# Patient Record
Sex: FEMALE | Race: WHITE | NOT HISPANIC OR LATINO | Employment: OTHER | URBAN - METROPOLITAN AREA
[De-identification: names, ages, dates, MRNs, and addresses within clinical notes are randomized per-mention and may not be internally consistent; named-entity substitution may affect disease eponyms.]

---

## 2017-01-18 ENCOUNTER — HISTORICAL (OUTPATIENT)
Dept: ADMINISTRATIVE | Facility: HOSPITAL | Age: 47
End: 2017-01-18

## 2017-04-04 ENCOUNTER — HISTORICAL (OUTPATIENT)
Dept: ADMINISTRATIVE | Facility: HOSPITAL | Age: 47
End: 2017-04-04

## 2017-06-12 ENCOUNTER — OFFICE VISIT (OUTPATIENT)
Dept: NEUROLOGY | Facility: CLINIC | Age: 47
End: 2017-06-12
Payer: MEDICARE

## 2017-06-12 VITALS
BODY MASS INDEX: 25.37 KG/M2 | RESPIRATION RATE: 16 BRPM | WEIGHT: 161.63 LBS | HEART RATE: 80 BPM | SYSTOLIC BLOOD PRESSURE: 136 MMHG | HEIGHT: 67 IN | DIASTOLIC BLOOD PRESSURE: 82 MMHG

## 2017-06-12 DIAGNOSIS — M54.81 OCCIPITAL NEURALGIA OF LEFT SIDE: ICD-10-CM

## 2017-06-12 DIAGNOSIS — G43.719 CHRONIC MIGRAINE WITHOUT AURA, INTRACTABLE, WITHOUT STATUS MIGRAINOSUS: ICD-10-CM

## 2017-06-12 DIAGNOSIS — G24.3 CERVICAL DYSTONIA: Primary | ICD-10-CM

## 2017-06-12 PROCEDURE — 99204 OFFICE O/P NEW MOD 45 MIN: CPT | Mod: S$PBB | Performed by: PSYCHIATRY & NEUROLOGY

## 2017-06-12 PROCEDURE — 99203 OFFICE O/P NEW LOW 30 MIN: CPT | Mod: PBBFAC | Performed by: PSYCHIATRY & NEUROLOGY

## 2017-06-12 PROCEDURE — 99999 PR PBB SHADOW E&M-NEW PATIENT-LVL III: CPT | Mod: PBBFAC,,, | Performed by: PSYCHIATRY & NEUROLOGY

## 2017-06-12 RX ORDER — NEBIVOLOL 5 MG/1
2.5 TABLET ORAL DAILY
COMMUNITY
End: 2019-11-07 | Stop reason: SDUPTHER

## 2017-06-12 RX ORDER — DIAZEPAM 10 MG/1
10 TABLET ORAL
COMMUNITY
End: 2019-08-05 | Stop reason: SDUPTHER

## 2017-06-12 RX ORDER — TIZANIDINE 4 MG/1
4 TABLET ORAL 2 TIMES DAILY
COMMUNITY
End: 2018-06-21 | Stop reason: SDUPTHER

## 2017-06-12 RX ORDER — MIRTAZAPINE 15 MG/1
7.5 TABLET, FILM COATED ORAL NIGHTLY
COMMUNITY
End: 2020-07-06

## 2017-06-12 RX ORDER — HYDROCODONE BITARTRATE AND ACETAMINOPHEN 10; 325 MG/1; MG/1
TABLET ORAL EVERY 6 HOURS PRN
COMMUNITY
End: 2019-05-14 | Stop reason: SDUPTHER

## 2017-06-12 RX ORDER — BACLOFEN 10 MG/1
10 TABLET ORAL EVERY 4 HOURS
COMMUNITY
End: 2018-06-21 | Stop reason: SDUPTHER
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
## 2017-07-18 ENCOUNTER — TELEPHONE (OUTPATIENT)
Dept: NEUROLOGY | Facility: CLINIC | Age: 47
End: 2017-07-18

## 2017-07-18 DIAGNOSIS — G24.3 CERVICAL DYSTONIA: Primary | ICD-10-CM

## 2017-07-18 NOTE — TELEPHONE ENCOUNTER
Patient notified of appointment times and date. Voices understanding that she may need to see NP before injection date.

## 2017-07-18 NOTE — TELEPHONE ENCOUNTER
----- Message from June Resendiz sent at 2017 11:19 AM CDT -----  Contact: PATIENT  Daly Ramos  MRN: [id number]  : 1970  PCP: Jessica Fraga  Home Phone      [phone]  Work Phone      Not on file.  Mobile          [phone]      MESSAGE: Patient needs to schedule BOTOX & trigger point injections 3 months from now.  She states that Dr. Ruiz advised her to do so.      Phone: [phone]

## 2017-07-18 NOTE — TELEPHONE ENCOUNTER
Spoke with patient she states that she had her last Botox on 7/11/17 and would like to schedule her next Botox for October with you. She also stated that she gets Trigger Point injections also and would like to do these as well. She states that she would go one day for Trigger Point and the next for the Botox. Please advise. Previous records in [facility] from neurology.

## 2017-07-18 NOTE — TELEPHONE ENCOUNTER
Please review her case with Robyn upon Robyn's return to see if she needs to be seen prior to Trigger point injections.   Otherwise, ok to scheduled Botox 3 months from 7/11/17 injection  300 units of Botox ordered under orders only encounter.

## 2017-10-11 ENCOUNTER — PROCEDURE VISIT (OUTPATIENT)
Dept: NEUROLOGY | Facility: CLINIC | Age: 47
End: 2017-10-11
Payer: MEDICARE

## 2017-10-11 DIAGNOSIS — M79.18 MYOFASCIAL PAIN SYNDROME, CERVICAL: Primary | ICD-10-CM

## 2017-10-11 PROCEDURE — 20553 NJX 1/MLT TRIGGER POINTS 3/>: CPT | Mod: S$PBB | Performed by: NURSE PRACTITIONER

## 2017-10-11 PROCEDURE — 20553 NJX 1/MLT TRIGGER POINTS 3/>: CPT | Mod: PBBFAC | Performed by: NURSE PRACTITIONER

## 2017-10-11 PROCEDURE — 99999 PR STA SHADOW: CPT | Mod: PBBFAC,,, | Performed by: NURSE PRACTITIONER

## 2017-10-11 NOTE — PROCEDURES
Procedures Cervical TPI    Trigger points were palpated bilaterally along the left trapezius, Rhomboids, and Levator Scapulae. 10 sites were prepped with alcohol, and injected with 1 mL 0.5% Marcaine, using a 5 mL syringe with a 27 gauge ½ inch needle.     Patient tolerated procedure well and blood loss was minimal.

## 2017-10-12 ENCOUNTER — PROCEDURE VISIT (OUTPATIENT)
Dept: NEUROLOGY | Facility: CLINIC | Age: 47
End: 2017-10-12
Payer: MEDICARE

## 2017-10-12 DIAGNOSIS — G43.719 CHRONIC MIGRAINE WITHOUT AURA, INTRACTABLE, WITHOUT STATUS MIGRAINOSUS: ICD-10-CM

## 2017-10-12 DIAGNOSIS — G24.3 CERVICAL DYSTONIA: Primary | ICD-10-CM

## 2017-10-12 PROCEDURE — 99999 PR STA SHADOW: CPT | Mod: PBBFAC,,, | Performed by: PSYCHIATRY & NEUROLOGY

## 2017-10-12 PROCEDURE — 64616 CHEMODENERV MUSC NECK DYSTON: CPT | Mod: 50,S$PBB | Performed by: PSYCHIATRY & NEUROLOGY

## 2017-10-12 RX ORDER — SENNOSIDES 25 MG/1
TABLET, FILM COATED ORAL
Qty: 1 TUBE | Refills: 11 | Status: SHIPPED | OUTPATIENT
Start: 2017-10-12 | End: 2021-05-12 | Stop reason: SDUPTHER

## 2017-10-12 RX ADMIN — ONABOTULINUMTOXINA 300 UNITS: 100 INJECTION, POWDER, LYOPHILIZED, FOR SOLUTION INTRADERMAL; INTRAMUSCULAR at 02:10

## 2017-10-12 NOTE — PROCEDURES
Procedures     BOTOX PROCEDURE NOTE     Date of Procedure: 10/12/17    Reason for Proceedure: Chronic Migraine and cervical dystonia      Informed consent was obtained prior to performing this study. Two patient identifiers were confirmed with the patient prior to performing this study. A time out to determine correct patient and and agreement on procedure performed was conducted prior to the injections.   Patient uses  topical ethyl chloride for topical  anaesthetic- she uses this prior to injections no adverse reactions noted with her topical use today. Can continue PRN prior to injections  Procedure Details: After informed consent obtained the patient's head and upper neck was cleansed with alcohol rub and 285 Units of Botox (diluted 1:1) was injected in the following bilateral muscles:   5units in corregator* (over 2 sites), 5 units in Procerus, 20 units Frontalis* (over 4 sites), 20 units in Temporalis* (over 4 sites), 30 units in occipitalis* (over 6 sites), 10 units in each semispinalis capitis, 10 units in each splenius capitus, 10 units in the right longismus and 15 units in the left longismus, 60 units in Trapezius* on the right and 70 units on trapezius on the left(over 4 sites). The remaining 15 units were wasted per her last injection series  Levator muscles were not injected as patient states she had side effects with those injections prior.   The patient tolerated the procedure well with no more than 0.25cc of blood loss. She was observed for several minutes post injection and given a handout from [employer] regarding when and where to seek help if side effects are experienced.  Reviewed her prior studies and EMG findings (cervical radiculopathy, right median and ulnar neuropathies) - she states migraine and dystonia pain are her only active complaints. No further work up or treatment needed.   Lidoderm topical cream ordered for her neck pain PRN- helped prior.   RTC in 6 weeks.

## 2017-11-28 ENCOUNTER — OFFICE VISIT (OUTPATIENT)
Dept: NEUROLOGY | Facility: CLINIC | Age: 47
End: 2017-11-28
Payer: MEDICARE

## 2017-11-28 VITALS
SYSTOLIC BLOOD PRESSURE: 132 MMHG | BODY MASS INDEX: 26.21 KG/M2 | WEIGHT: 167 LBS | DIASTOLIC BLOOD PRESSURE: 78 MMHG | HEIGHT: 67 IN

## 2017-11-28 DIAGNOSIS — M79.18 MYOFASCIAL PAIN: ICD-10-CM

## 2017-11-28 DIAGNOSIS — G24.3 CERVICAL DYSTONIA: ICD-10-CM

## 2017-11-28 PROCEDURE — 99999 PR PBB SHADOW E&M-EST. PATIENT-LVL III: CPT | Mod: PBBFAC,,, | Performed by: NURSE PRACTITIONER

## 2017-11-28 PROCEDURE — 99213 OFFICE O/P EST LOW 20 MIN: CPT | Mod: PBBFAC | Performed by: NURSE PRACTITIONER

## 2017-11-28 PROCEDURE — 99999 PR STA SHADOW: CPT | Mod: PBBFAC,,, | Performed by: NURSE PRACTITIONER

## 2017-11-28 PROCEDURE — 99214 OFFICE O/P EST MOD 30 MIN: CPT | Mod: S$PBB | Performed by: NURSE PRACTITIONER
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
## 2018-01-10 ENCOUNTER — PROCEDURE VISIT (OUTPATIENT)
Dept: NEUROLOGY | Facility: CLINIC | Age: 48
End: 2018-01-10
Payer: MEDICARE

## 2018-01-10 DIAGNOSIS — M79.18 MYOFASCIAL PAIN: Primary | ICD-10-CM

## 2018-01-10 PROCEDURE — 20553 NJX 1/MLT TRIGGER POINTS 3/>: CPT | Mod: PBBFAC | Performed by: NURSE PRACTITIONER

## 2018-01-10 PROCEDURE — 20553 NJX 1/MLT TRIGGER POINTS 3/>: CPT | Mod: S$PBB | Performed by: NURSE PRACTITIONER

## 2018-01-10 PROCEDURE — 99999 PR STA SHADOW: CPT | Mod: PBBFAC,,, | Performed by: NURSE PRACTITIONER

## 2018-01-10 NOTE — PROCEDURES
Procedures Cervical TPI    Trigger points were palpated along the left trapezius, Rhomboids, and Levator Scapulae. Sites were prepped with alcohol, and injected with 1 mL 0.5% Marcaine, using a 5 mL syringe with a 30 gauge ½ inch needle.     Patient tolerated procedure well and blood loss was minimal.

## 2018-01-11 ENCOUNTER — PROCEDURE VISIT (OUTPATIENT)
Dept: NEUROLOGY | Facility: CLINIC | Age: 48
End: 2018-01-11
Payer: MEDICARE

## 2018-01-11 DIAGNOSIS — G24.3 CERVICAL DYSTONIA: Primary | ICD-10-CM

## 2018-01-11 DIAGNOSIS — G43.719 CHRONIC MIGRAINE WITHOUT AURA, INTRACTABLE, WITHOUT STATUS MIGRAINOSUS: ICD-10-CM

## 2018-01-11 PROCEDURE — 64616 CHEMODENERV MUSC NECK DYSTON: CPT | Mod: 50,S$PBB | Performed by: PSYCHIATRY & NEUROLOGY

## 2018-01-11 PROCEDURE — 99999 PR STA SHADOW: CPT | Mod: PBBFAC,,, | Performed by: PSYCHIATRY & NEUROLOGY

## 2018-01-11 RX ADMIN — ONABOTULINUMTOXINA 300 UNITS: 100 INJECTION, POWDER, LYOPHILIZED, FOR SOLUTION INTRADERMAL; INTRAMUSCULAR at 02:01

## 2018-01-11 NOTE — PROCEDURES
Procedures     BOTOX PROCEDURE NOTE     Date of Procedure: 1/11/18    Reason for Proceedure: Chronic Migraine and cervical dystonia      Informed consent was obtained prior to performing this study. Two patient identifiers were confirmed with the patient prior to performing this study. A time out to determine correct patient and and agreement on procedure performed was conducted prior to the injections.   Patient uses  topical ethyl chloride for topical  anaesthetic- she uses this prior to injections no adverse reactions noted with her topical use today. Can continue PRN prior to injections  Procedure Details: After informed consent obtained the patient's head and upper neck was cleansed with alcohol rub and 285 Units of Botox (diluted 1:1) was injected in the following bilateral muscles:   5units in corregator* (over 2 sites), 5 units in Procerus, 20 units Frontalis* (over 4 sites), 20 units in Temporalis* (over 4 sites), 30 units in occipitalis* (over 6 sites), 10 units in each semispinalis capitis, 10 units in each splenius capitus, 10 units in the right longismus and 15 units in the left longismus, 60 units in Trapezius* on the right and 70 units on trapezius on the left(over 8 sites). The remaining 15 units were wasted per her last injection series  Levator muscles were not injected as patient states she had side effects with those injections prior.   The patient tolerated the procedure well with no more than 0.25cc of blood loss. She was observed for several minutes post injection and given a handout from [employer] regarding when and where to seek help if side effects are experienced.  -Note injections were spread to more sights for Trapezius(same amount of Botox) with this injection series per her request  -Note her prior studies and  EMG findings (cervical radiculopathy, right median and ulnar neuropathies) . However, she states migraine and dystonia pain are her only active complaints. No further work up or  treatment needed at this time.   - Continue scheduled left cervical trigger point injections to occur the same week as Botox q 3 months.Lidoderm topical cream given prior for her neck pain PRN  -Continue Botox for chronic migraine and for cervical dystonia. She uses 300 units which does help reduce pain from both conditions. .  -She continues with prn use of Hydrocodone, which can worsen her headaches. She was previously encouraged to wean this  RTC in 3 months for  Botox and for cervical trigger point injections.

## 2018-04-11 ENCOUNTER — PROCEDURE VISIT (OUTPATIENT)
Dept: NEUROLOGY | Facility: CLINIC | Age: 48
End: 2018-04-11
Payer: MEDICARE

## 2018-04-11 DIAGNOSIS — M79.18 MYOFASCIAL PAIN: Primary | ICD-10-CM

## 2018-04-11 PROCEDURE — 99999 PR STA SHADOW: CPT | Mod: PBBFAC,,, | Performed by: NURSE PRACTITIONER

## 2018-04-11 PROCEDURE — 20553 NJX 1/MLT TRIGGER POINTS 3/>: CPT | Mod: S$PBB | Performed by: NURSE PRACTITIONER

## 2018-04-11 PROCEDURE — 20553 NJX 1/MLT TRIGGER POINTS 3/>: CPT | Mod: PBBFAC | Performed by: NURSE PRACTITIONER

## 2018-04-11 NOTE — PROCEDURES
Procedures Cervical TPI    Trigger points were palpated  along the right trapezius, Rhomboids, and Levator Scapulae. Sites were prepped with alcohol, and injected with 1 mL 0.5% Marcaine, using a 5 mL syringe with a 30 gauge ½ inch needle.     Patient tolerated procedure well and blood loss was minimal.

## 2018-04-12 ENCOUNTER — PROCEDURE VISIT (OUTPATIENT)
Dept: NEUROLOGY | Facility: CLINIC | Age: 48
End: 2018-04-12
Payer: MEDICARE

## 2018-04-12 DIAGNOSIS — M79.18 MYOFASCIAL MUSCLE PAIN: ICD-10-CM

## 2018-04-12 DIAGNOSIS — G43.719 CHRONIC MIGRAINE WITHOUT AURA, INTRACTABLE, WITHOUT STATUS MIGRAINOSUS: ICD-10-CM

## 2018-04-12 DIAGNOSIS — G24.3 CERVICAL DYSTONIA: Primary | ICD-10-CM

## 2018-04-12 PROCEDURE — 99999 PR STA SHADOW: CPT | Mod: PBBFAC,,, | Performed by: PSYCHIATRY & NEUROLOGY

## 2018-04-12 PROCEDURE — 64616 CHEMODENERV MUSC NECK DYSTON: CPT | Mod: 50,PBBFAC | Performed by: PSYCHIATRY & NEUROLOGY

## 2018-04-12 RX ADMIN — ONABOTULINUMTOXINA 300 UNITS: 100 INJECTION, POWDER, LYOPHILIZED, FOR SOLUTION INTRADERMAL; INTRAMUSCULAR at 01:04

## 2018-04-12 NOTE — PROCEDURES
Procedures   Date of Procedure: 4/12/18    Reason for Proceedure: Chronic Migraine and cervical dystonia      Informed consent was obtained prior to performing this study. Two patient identifiers were confirmed with the patient prior to performing this study. A time out to determine correct patient and and agreement on procedure performed was conducted prior to the injections.   Patient uses  topical ethyl chloride for topical  anaesthetic- she uses this prior to injections with no adverse reactions noted with her topical use. Can continue PRN prior to injections  Procedure Details: After informed consent obtained the patient's head and upper neck was cleansed with alcohol rub and 285 Units of Botox (diluted 1:1) was injected in the following bilateral muscles:   5units in corregator* (over 2 sites), 5 units in Procerus, 20 units Frontalis* (over 4 sites), 20 units in Temporalis* (over 4 sites), 30 units in occipitalis* (over 6 sites), 10 units in each semispinalis capitis, 10 units in each splenius capitus, 10 units in the right longismus and 15 units in the left longismus, 60 units in Trapezius* on the right and 70 units on trapezius on the left(over 8 sites). The remaining 15 units were wasted per her last injection series  Levator muscles were not injected as patient states she had side effects with those injections prior.   The patient tolerated the procedure well with no more than 0.25cc of blood loss. She was observed for several minutes post injection and given a handout from [employer] regarding when and where to seek help if side effects are experienced.  -Note injections were spread to more sights for Trapezius(same amount of Botox) per her request again today which helped  -Note her prior known cervical radiculopathy, right median and ulnar neuropathies which are not clinically active .   - Ok to Continue cervical trigger point injections the same week as Botox q 3 months Yesterday, she noted brief left  arm tingling after injection, which was likely a benign incident post local injection. Notify me if any recurrence. .Lidoderm topical cream given prior for her neck pain PRN  -Continue Botox for chronic migraine and for cervical dystonia. She uses 300 units which does help reduce pain from both conditions for 1.5 months after injection .  -She continues with prn use of Hydrocodone, which can worsen her headaches and was previously encouraged to wean this  RTC in 3 months for  Botox and for cervical trigger point injections.

## 2018-06-20 ENCOUNTER — PATIENT MESSAGE (OUTPATIENT)
Dept: NEUROLOGY | Facility: CLINIC | Age: 48
End: 2018-06-20

## 2018-06-21 RX ORDER — TIZANIDINE 4 MG/1
4 TABLET ORAL 3 TIMES DAILY
Qty: 270 TABLET | Refills: 3 | Status: SHIPPED | OUTPATIENT
Start: 2018-06-21 | End: 2019-06-16

## 2018-06-21 RX ORDER — BACLOFEN 10 MG/1
10 TABLET ORAL EVERY 4 HOURS
Qty: 540 TABLET | Refills: 3 | Status: SHIPPED | OUTPATIENT
Start: 2018-06-21 | End: 2019-06-07 | Stop reason: SDUPTHER

## 2018-07-11 ENCOUNTER — PROCEDURE VISIT (OUTPATIENT)
Dept: NEUROLOGY | Facility: CLINIC | Age: 48
End: 2018-07-11
Payer: MEDICARE

## 2018-07-11 DIAGNOSIS — M79.18 MYOFASCIAL MUSCLE PAIN: ICD-10-CM

## 2018-07-11 PROCEDURE — 20553 NJX 1/MLT TRIGGER POINTS 3/>: CPT | Mod: PBBFAC | Performed by: NURSE PRACTITIONER

## 2018-07-11 PROCEDURE — 20553 NJX 1/MLT TRIGGER POINTS 3/>: CPT | Mod: S$PBB | Performed by: NURSE PRACTITIONER

## 2018-07-11 PROCEDURE — 99999 PR STA SHADOW: CPT | Mod: PBBFAC,,, | Performed by: NURSE PRACTITIONER

## 2018-07-12 ENCOUNTER — PROCEDURE VISIT (OUTPATIENT)
Dept: NEUROLOGY | Facility: CLINIC | Age: 48
End: 2018-07-12
Payer: MEDICARE

## 2018-07-12 DIAGNOSIS — G43.719 CHRONIC MIGRAINE WITHOUT AURA, INTRACTABLE, WITHOUT STATUS MIGRAINOSUS: ICD-10-CM

## 2018-07-12 DIAGNOSIS — M79.18 MYOFASCIAL MUSCLE PAIN: ICD-10-CM

## 2018-07-12 DIAGNOSIS — G24.3 CERVICAL DYSTONIA: Primary | ICD-10-CM

## 2018-07-12 PROCEDURE — 99999 PR STA SHADOW: CPT | Mod: PBBFAC,,, | Performed by: PSYCHIATRY & NEUROLOGY

## 2018-07-12 PROCEDURE — 64616 CHEMODENERV MUSC NECK DYSTON: CPT | Mod: 50,PBBFAC | Performed by: PSYCHIATRY & NEUROLOGY

## 2018-07-12 RX ADMIN — ONABOTULINUMTOXINA 300 UNITS: 100 INJECTION, POWDER, LYOPHILIZED, FOR SOLUTION INTRADERMAL; INTRAMUSCULAR at 02:07

## 2018-07-12 NOTE — PROCEDURES
Procedures     Procedures   Date of Procedure: 7/12/18    Reason for Proceedure: Chronic Migraine and cervical dystonia      Informed consent was obtained prior to performing this study. Two patient identifiers were confirmed with the patient prior to performing this study. A time out to determine correct patient and and agreement on procedure performed was conducted prior to the injections.   Patient uses  topical ethyl chloride for topical  anaesthetic- she uses this prior to injections with no adverse reactions noted with her topical use. Can continue PRN prior to injections  Procedure Details: After informed consent obtained the patient's head and upper neck was cleansed with alcohol rub and 285 Units of Botox (diluted 1:1) was injected in the following bilateral muscles:   5units in corregator* (over 2 sites), 5 units in Procerus, 20 units Frontalis* (over 4 sites), 20 units in Temporalis* (over 4 sites), 30 units in occipitalis* (over 6 sites), 10 units in each semispinalis capitis, 10 units in each splenius capitus, 10 units in the right longismus and 15 units in the left longismus, 60 units in Trapezius* on the right and 70 units on trapezius on the left(over 8 sites). The remaining 15 units were wasted per her last injection series  Levator muscles were not injected as patient states she had side effects with those injections prior.   The patient tolerated the procedure well with no more than 0.25cc of blood loss. She was observed for several minutes post injection and given a handout from [employer] regarding when and where to seek help if side effects are experienced.    -Note injections are  spread to more sights for Trapezius(same amount of Botox) per her request as this helps  -Note her prior known cervical radiculopathy, right median and ulnar neuropathies which are not clinically active .   - Ok to Continue cervical trigger point injections the same week as Botox q 3 months. She has not had any  recurrent symptoms after injection .Lidoderm topical cream given prior for her neck pain PRN  -Continue Botox for chronic migraine and for cervical dystonia. She uses 300 units which does help reduce pain from both conditions for 1.5 months after injection .  -She continues with prn use of Hydrocodone, which can worsen her headaches and was previously encouraged to wean this  -Continue baclofen along with tizanidine for additional muscle relaxation unless sedation- she has chronically dosed all at bedtime without sedation  RTC in 3 months for  Botox and for cervical trigger point injections.

## 2018-10-08 ENCOUNTER — PATIENT MESSAGE (OUTPATIENT)
Dept: NEUROLOGY | Facility: CLINIC | Age: 48
End: 2018-10-08

## 2018-10-10 ENCOUNTER — PROCEDURE VISIT (OUTPATIENT)
Dept: NEUROLOGY | Facility: CLINIC | Age: 48
End: 2018-10-10
Payer: MEDICARE

## 2018-10-10 DIAGNOSIS — M79.18 MYOFASCIAL MUSCLE PAIN: ICD-10-CM

## 2018-10-10 PROCEDURE — 99999 PR STA SHADOW: CPT | Mod: PBBFAC,,, | Performed by: NURSE PRACTITIONER

## 2018-10-10 PROCEDURE — 20553 NJX 1/MLT TRIGGER POINTS 3/>: CPT | Mod: S$PBB | Performed by: NURSE PRACTITIONER

## 2018-10-10 NOTE — PROCEDURES
Procedures Cervical TPI    Trigger points were palpated bilaterally along the Bilateral trapezius, Rhomboids, and Levator Scapulae. Sites were prepped with alcohol, and injected with 1 mL 0.5% Marcaine, using a 5 mL syringe with a 30 gauge ½ inch needle.     Patient tolerated procedure well and blood loss was minimal.

## 2018-10-11 ENCOUNTER — PROCEDURE VISIT (OUTPATIENT)
Dept: NEUROLOGY | Facility: CLINIC | Age: 48
End: 2018-10-11
Payer: MEDICARE

## 2018-10-11 DIAGNOSIS — M79.18 MYOFASCIAL PAIN: ICD-10-CM

## 2018-10-11 DIAGNOSIS — M54.2 CERVICALGIA: ICD-10-CM

## 2018-10-11 DIAGNOSIS — G43.719 CHRONIC MIGRAINE WITHOUT AURA, INTRACTABLE, WITHOUT STATUS MIGRAINOSUS: ICD-10-CM

## 2018-10-11 DIAGNOSIS — G24.3 CERVICAL DYSTONIA: Primary | ICD-10-CM

## 2018-10-11 PROCEDURE — 99999 PR STA SHADOW: CPT | Mod: PBBFAC,,, | Performed by: PSYCHIATRY & NEUROLOGY

## 2018-10-11 PROCEDURE — 64616 CHEMODENERV MUSC NECK DYSTON: CPT | Mod: 50,S$PBB | Performed by: PSYCHIATRY & NEUROLOGY

## 2018-10-11 RX ADMIN — ONABOTULINUMTOXINA 300 UNITS: 100 INJECTION, POWDER, LYOPHILIZED, FOR SOLUTION INTRADERMAL; INTRAMUSCULAR at 03:10

## 2018-10-11 NOTE — PROCEDURES
Date of Procedure: 10/11/18    Reason for Procedure: Chronic Migraine and cervical dystonia      Informed consent was obtained prior to performing this study. Two patient identifiers were confirmed with the patient prior to performing this study. A time out to determine correct patient and and agreement on procedure performed was conducted prior to the injections.   Patient uses  topical ethyl chloride for topical  anaesthetic- she uses this prior to injections with no adverse reactions noted with her topical use. Can continue PRN prior to injections  Procedure Details: After informed consent obtained the patient's head and upper neck was cleansed with alcohol rub and 285 Units of Botox (diluted 1:1) was injected in the following bilateral muscles:   5units in corregator* (over 2 sites), 5 units in Procerus, 20 units Frontalis* (over 4 sites), 20 units in Temporalis* (over 4 sites), 30 units in occipitalis* (over 6 sites), 10 units in each semispinalis capitis, 10 units in each splenius capitus, 10 units in the right longismus and 15 units in the left longismus, 60 units in Trapezius* on the right and 70 units on trapezius on the left(over 8 sites). The remaining 15 units were wasted per her last injection series  Levator muscles were not injected as patient states she had side effects with those injections prior.   The patient tolerated the procedure well with no more than 0.25cc of blood loss. She was observed for several minutes post injection and given a handout from [employer] regarding when and where to seek help if side effects are experienced.     -Note injections are  spread to more sights for Trapezius(same amount of Botox) per her request as this helps  -Note her prior known cervical radiculopathy, right median and ulnar neuropathies which are not clinically active .   - She will Continue cervical trigger point injections the same week as Botox q 3 months for cervicalgia. .Lidoderm topical cream given prior  for her neck pain PRN  -Continue Botox for chronic migraine and for cervical dystonia. She uses 300 units which does help reduce pain from both conditions for 1.5 months consistently after injection .  -She continues with prn use of Hydrocodone via PCP, which can worsen her headaches, and she was previously encouraged to wean this  -Continue baclofen along with tizanidine for additional muscle relaxation unless sedation- she has chronically dosed all at bedtime without sedation  RTC in 3 months for  Botox and for cervical trigger point injections.

## 2019-01-09 ENCOUNTER — PROCEDURE VISIT (OUTPATIENT)
Dept: NEUROLOGY | Facility: CLINIC | Age: 49
End: 2019-01-09
Payer: MEDICARE

## 2019-01-09 DIAGNOSIS — M54.2 CERVICALGIA: Primary | ICD-10-CM

## 2019-01-09 DIAGNOSIS — M79.18 MYOFASCIAL PAIN: ICD-10-CM

## 2019-01-09 PROCEDURE — 99999 PR STA SHADOW: CPT | Mod: PBBFAC,,, | Performed by: NURSE PRACTITIONER

## 2019-01-09 PROCEDURE — 99999 PR STA SHADOW: ICD-10-PCS | Mod: PBBFAC,,, | Performed by: NURSE PRACTITIONER

## 2019-01-09 PROCEDURE — 20553 NJX 1/MLT TRIGGER POINTS 3/>: CPT | Mod: PBBFAC | Performed by: NURSE PRACTITIONER

## 2019-01-10 ENCOUNTER — PROCEDURE VISIT (OUTPATIENT)
Dept: NEUROLOGY | Facility: CLINIC | Age: 49
End: 2019-01-10
Payer: MEDICARE

## 2019-01-10 DIAGNOSIS — G43.719 CHRONIC MIGRAINE WITHOUT AURA, INTRACTABLE, WITHOUT STATUS MIGRAINOSUS: ICD-10-CM

## 2019-01-10 DIAGNOSIS — M79.18 MYOFASCIAL PAIN: ICD-10-CM

## 2019-01-10 DIAGNOSIS — G24.3 CERVICAL DYSTONIA: Primary | ICD-10-CM

## 2019-01-10 PROCEDURE — 99999 PR STA SHADOW: ICD-10-PCS | Mod: PBBFAC,,, | Performed by: PSYCHIATRY & NEUROLOGY

## 2019-01-10 PROCEDURE — 99999 PR STA SHADOW: CPT | Mod: PBBFAC,,, | Performed by: PSYCHIATRY & NEUROLOGY

## 2019-01-10 PROCEDURE — 64616 CHEMODENERV MUSC NECK DYSTON: CPT | Mod: S$PBB | Performed by: PSYCHIATRY & NEUROLOGY

## 2019-01-10 RX ADMIN — ONABOTULINUMTOXINA 300 UNITS: 100 INJECTION, POWDER, LYOPHILIZED, FOR SOLUTION INTRADERMAL; INTRAMUSCULAR at 03:01

## 2019-01-10 NOTE — PROCEDURES
Date of Procedure: 1/10/18    Reason for Procedure: Chronic Migraine and cervical dystonia      Informed consent was obtained prior to performing this study. Two patient identifiers were confirmed with the patient prior to performing this study. A time out to determine correct patient and and agreement on procedure performed was conducted prior to the injections.   Patient uses  topical ethyl chloride for topical  anaesthetic- she uses this prior to injections with no adverse reactions noted with her topical use. Can continue PRN prior to injections  Procedure Details: After informed consent obtained the patient's head and upper neck was cleansed with alcohol rub and 285 Units of Botox (diluted 1:1) was injected in the following bilateral muscles:   5units in corregator* (over 2 sites), 5 units in Procerus, 20 units Frontalis* (over 4 sites), 20 units in Temporalis* (over 4 sites), 30 units in occipitalis* (over 6 sites), 10 units in each semispinalis capitis, 10 units in each splenius capitus, 10 units in the right longismus and 15 units in the left longismus, 60 units in Trapezius* on the right and 70 units on trapezius on the left(over 8 sites). The remaining 15 units were wasted per her last injection series  Levator muscles were not injected as patient states she had side effects with those injections prior.   The patient tolerated the procedure well with no more than 0.25cc of blood loss. She was observed for several minutes post injection and given a handout from [employer] regarding when and where to seek help if side effects are experienced.     -Note injections are  spread to more sights for Trapezius(same amount of Botox) per her request as this helps  -Note her prior known cervical radiculopathy, right median and ulnar neuropathies which are not clinically active .   - She will Continue cervical trigger point injections the same week as Botox q 3 months for cervicalgia. .Lidoderm topical cream given prior  for her neck pain PRN  -Continue Botox for chronic migraine and for cervical dystonia. She uses 300 units which does help reduce pain from both conditions for 2.5 months consistently after injection now (improved further) .  -She continues with prn use of Hydrocodone via PCP, which can worsen her headaches, and she was previously encouraged to wean this  -Continue baclofen along with tizanidine for additional muscle relaxation unless sedation- she has chronically dosed all at bedtime without sedation  RTC in 3 months for  Botox and for cervical trigger point injections.

## 2019-04-10 ENCOUNTER — PROCEDURE VISIT (OUTPATIENT)
Dept: NEUROLOGY | Facility: CLINIC | Age: 49
End: 2019-04-10
Payer: MEDICARE

## 2019-04-10 DIAGNOSIS — M79.18 MYOFASCIAL PAIN: ICD-10-CM

## 2019-04-10 PROCEDURE — 99999 PR STA SHADOW: CPT | Mod: PBBFAC,,, | Performed by: NURSE PRACTITIONER

## 2019-04-10 PROCEDURE — 99999 PR STA SHADOW: ICD-10-PCS | Mod: PBBFAC,,, | Performed by: NURSE PRACTITIONER

## 2019-04-10 PROCEDURE — 20553 NJX 1/MLT TRIGGER POINTS 3/>: CPT | Mod: PBBFAC | Performed by: NURSE PRACTITIONER

## 2019-04-10 NOTE — PROCEDURES
Procedures Cervical TPI    Trigger points were palpated along the right trapezius, Rhomboid, and Levator Scapulae. Sites were prepped with alcohol, and injected with 1 mL 0.5% Marcaine, using a 5 mL syringe with a 30 gauge ½ inch needle.     Patient tolerated procedure well and blood loss was minimal.

## 2019-04-11 ENCOUNTER — PROCEDURE VISIT (OUTPATIENT)
Dept: NEUROLOGY | Facility: CLINIC | Age: 49
End: 2019-04-11
Payer: MEDICARE

## 2019-04-11 DIAGNOSIS — M79.18 MYOFASCIAL PAIN: ICD-10-CM

## 2019-04-11 DIAGNOSIS — G24.3 CERVICAL DYSTONIA: Primary | ICD-10-CM

## 2019-04-11 PROCEDURE — 64616 CHEMODENERV MUSC NECK DYSTON: CPT | Mod: 50,PBBFAC | Performed by: PSYCHIATRY & NEUROLOGY

## 2019-04-11 PROCEDURE — 99999 PR STA SHADOW: ICD-10-PCS | Mod: S$PBB,PBBFAC,, | Performed by: PSYCHIATRY & NEUROLOGY

## 2019-04-11 PROCEDURE — 99999 PR STA SHADOW: CPT | Mod: PBBFAC,,, | Performed by: PSYCHIATRY & NEUROLOGY

## 2019-04-11 RX ADMIN — ONABOTULINUMTOXINA 300 UNITS: 100 INJECTION, POWDER, LYOPHILIZED, FOR SOLUTION INTRADERMAL; INTRAMUSCULAR at 03:04

## 2019-04-11 NOTE — PROCEDURES
Procedures   Date of Procedure: 4/11/19    Reason for Procedure: Chronic Migraine and cervical dystonia      Informed consent was obtained prior to performing this study. Two patient identifiers were confirmed with the patient prior to performing this study. A time out to determine correct patient and and agreement on procedure performed was conducted prior to the injections.   Patient uses  topical ethyl chloride for topical  anaesthetic- she uses this prior to injections with no adverse reactions noted with her topical use. Can continue PRN prior to injections  Procedure Details: After informed consent obtained the patient's head and upper neck was cleansed with alcohol rub and 285 Units of Botox (diluted 1:1) was injected in the following bilateral muscles:   5units in corregator* (over 2 sites), 5 units in Procerus, 20 units Frontalis* (over 4 sites), 20 units in Temporalis* (over 4 sites), 30 units in occipitalis* (over 6 sites), 10 units in each semispinalis capitis, 10 units in each splenius capitus, 10 units in the right longismus and 15 units in the left longismus, 60 units in Trapezius* on the right and 70 units on trapezius on the left(over 8 sites). The remaining 15 units were wasted per her last injection series  Levator muscles were not injected as patient states she had side effects with those injections prior.   The patient tolerated the procedure well with no more than 0.25cc of blood loss. She was observed for several minutes post injection and given a handout from [employer] regarding when and where to seek help if side effects are experienced.     -Note injections are  spread to more sights for Trapezius(same amount of Botox) per her request as this helps  -Note her prior known cervical radiculopathy, right median and ulnar neuropathies which are not clinically active .   - She will Continue cervical trigger point injections the same week as Botox q 3 months for cervicalgia. .Lidoderm topical cream  given prior for her neck pain PRN  -Continue Botox for chronic migraine and for cervical dystonia. She uses nearly 300 units as above which does help reduce pain from both conditions for 2.5 months consistently after injection at this time  -She continues with prn use of Hydrocodone via PCP, which can worsen her headaches, and she was previously encouraged to wean this  -Continue baclofen along with tizanidine for additional muscle relaxation unless sedation with combination use- she has chronically dosed both at bedtime without sedation  RTC in 3 months for  Botox and for cervical trigger point injections.

## 2019-04-21 ENCOUNTER — PATIENT MESSAGE (OUTPATIENT)
Dept: NEUROLOGY | Facility: CLINIC | Age: 49
End: 2019-04-21

## 2019-05-14 PROBLEM — I10 ESSENTIAL (PRIMARY) HYPERTENSION: Status: ACTIVE | Noted: 2019-05-14

## 2019-05-14 PROBLEM — M41.25 OTHER IDIOPATHIC SCOLIOSIS, THORACOLUMBAR REGION: Status: ACTIVE | Noted: 2019-05-14

## 2019-05-14 PROBLEM — K59.03 DRUG-INDUCED CONSTIPATION: Status: ACTIVE | Noted: 2019-05-14

## 2019-06-10 RX ORDER — BACLOFEN 10 MG/1
10 TABLET ORAL EVERY 4 HOURS
Qty: 540 TABLET | Refills: 3 | Status: SHIPPED | OUTPATIENT
Start: 2019-06-10 | End: 2020-07-21 | Stop reason: SDUPTHER

## 2019-06-11 ENCOUNTER — PATIENT MESSAGE (OUTPATIENT)
Dept: NEUROLOGY | Facility: CLINIC | Age: 49
End: 2019-06-11

## 2019-06-11 DIAGNOSIS — M54.2 CERVICALGIA: Primary | ICD-10-CM

## 2019-07-10 ENCOUNTER — PROCEDURE VISIT (OUTPATIENT)
Dept: NEUROLOGY | Facility: CLINIC | Age: 49
End: 2019-07-10
Payer: MEDICARE

## 2019-07-10 DIAGNOSIS — M79.18 MYOFASCIAL PAIN: ICD-10-CM

## 2019-07-10 PROCEDURE — 99999 PR STA SHADOW: CPT | Mod: PBBFAC,,, | Performed by: NURSE PRACTITIONER

## 2019-07-10 PROCEDURE — 99999 PR STA SHADOW: ICD-10-PCS | Mod: PBBFAC,,, | Performed by: NURSE PRACTITIONER

## 2019-07-10 PROCEDURE — 20553 NJX 1/MLT TRIGGER POINTS 3/>: CPT | Mod: S$PBB | Performed by: NURSE PRACTITIONER

## 2019-07-10 NOTE — PROCEDURES
Procedures Cervical TPI    Trigger points were palpated along the left trapezius, Rhomboid, and Levator Scapulae. Sites were prepped with alcohol, and injected with 1 mL 0.5% Marcaine, using a 5 mL syringe with a 30 gauge ½ inch needle.     Patient tolerated procedure well and blood loss was minimal.

## 2019-07-11 ENCOUNTER — PROCEDURE VISIT (OUTPATIENT)
Dept: NEUROLOGY | Facility: CLINIC | Age: 49
End: 2019-07-11
Payer: MEDICARE

## 2019-07-11 DIAGNOSIS — M54.2 CERVICALGIA: ICD-10-CM

## 2019-07-11 DIAGNOSIS — G24.3 CERVICAL DYSTONIA: ICD-10-CM

## 2019-07-11 DIAGNOSIS — M79.18 MYOFASCIAL PAIN: Primary | ICD-10-CM

## 2019-07-11 PROCEDURE — 64616 CHEMODENERV MUSC NECK DYSTON: CPT | Mod: 50,S$PBB | Performed by: PSYCHIATRY & NEUROLOGY

## 2019-07-11 PROCEDURE — 99999 PR STA SHADOW: CPT | Mod: PBBFAC,,, | Performed by: PSYCHIATRY & NEUROLOGY

## 2019-07-11 PROCEDURE — 99999 PR STA SHADOW: ICD-10-PCS | Mod: PBBFAC,,, | Performed by: PSYCHIATRY & NEUROLOGY

## 2019-07-11 RX ADMIN — ONABOTULINUMTOXINA 300 UNITS: 100 INJECTION, POWDER, LYOPHILIZED, FOR SOLUTION INTRADERMAL; INTRAMUSCULAR at 03:07

## 2019-07-11 NOTE — PROCEDURES
Procedures     Procedures   Date of Procedure: 7/11/19    Reason for Procedure: Chronic Migraine and cervical dystonia      Informed consent was obtained prior to performing this study. Two patient identifiers were confirmed with the patient prior to performing this study. A time out to determine correct patient and and agreement on procedure performed was conducted prior to the injections.   Patient uses  topical ethyl chloride for topical  anaesthetic- she uses this prior to injections with no adverse reactions noted with her topical use. Can continue PRN prior to injections  Procedure Details: After informed consent obtained the patient's head and upper neck was cleansed with alcohol rub and 285 Units of Botox (diluted 1:1) was injected in the following bilateral muscles:   5units in corregator* (over 2 sites), 5 units in Procerus, 20 units Frontalis* (over 4 sites), 20 units in Temporalis* (over 4 sites), 30 units in occipitalis* (over 6 sites), 10 units in each semispinalis capitis, 10 units in each splenius capitus, 10 units in the right longismus and 15 units in the left longismus, 60 units in Trapezius* on the right and 70 units on trapezius on the left(over 8 sites). The remaining 15 units were wasted per her last injection series  Levator muscles were not injected as patient states she had side effects with those injections prior.   The patient tolerated the procedure well with no more than 0.25cc of blood loss. She was observed for several minutes post injection and given a handout from [employer] regarding when and where to seek help if side effects are experienced.     -Note injections are  spread to more sights for Trapezius(same amount of Botox) per her request as this helps  -She had emailed that she had no benefit 10 days after the last injection but states her symptoms improved again as expected after Botox  -Note her prior known cervical radiculopathy, right median and ulnar neuropathies which are  not clinically active .   - She will Continue cervical trigger point injections the same week as Botox q 3 months for cervicalgia. .Lidoderm topical cream given prior for her neck pain PRN  -Continue Botox for chronic migraine and for cervical dystonia. She uses nearly 300 units as above which does help reduce pain from both conditions usually  2.5 months consistently after injection   -She continues with prn use of Hydrocodone via PCP, which can worsen her headaches, and she was previously encouraged to wean this. PCP is currently weaning her and she is using dry needling as an alternative measure for pain control. Updated spinal xrays ordered by PCP reviewed  -Her anxiety contributes to some of her symptoms and she was encouraged to seek further treatment for this as needed.   -Continue baclofen along with tizanidine for additional muscle relaxation unless sedation with combination use- she has chronically dosed both at bedtime without sedation  RTC in 3 months for  Botox and for cervical trigger point injections.

## 2019-07-23 RX ORDER — TIZANIDINE 4 MG/1
4 TABLET ORAL 3 TIMES DAILY
Qty: 270 TABLET | Refills: 3 | Status: SHIPPED | OUTPATIENT
Start: 2019-07-23 | End: 2020-12-04 | Stop reason: SDUPTHER

## 2019-09-10 PROBLEM — Z71.3 DIETARY COUNSELING: Status: ACTIVE | Noted: 2019-09-10

## 2019-09-10 PROBLEM — M15.9 GENERALIZED OSTEOARTHROSIS, INVOLVING MULTIPLE SITES: Status: ACTIVE | Noted: 2019-09-10

## 2019-09-10 PROBLEM — M41.52: Status: ACTIVE | Noted: 2019-09-10

## 2019-09-10 PROBLEM — F41.9 CHRONIC ANXIETY: Status: ACTIVE | Noted: 2019-09-10

## 2019-09-10 PROBLEM — F31.9 BIPOLAR 1 DISORDER, DEPRESSED: Chronic | Status: ACTIVE | Noted: 2019-09-10

## 2019-09-10 PROBLEM — M54.2 CERVICALGIA OF OCCIPITO-ATLANTO-AXIAL REGION: Status: ACTIVE | Noted: 2019-09-10

## 2019-09-10 PROBLEM — M79.672 LEFT FOOT PAIN: Chronic | Status: ACTIVE | Noted: 2019-09-10

## 2019-09-10 PROBLEM — Z87.828 HISTORY OF MOTOR VEHICLE ACCIDENT: Status: ACTIVE | Noted: 2019-09-10

## 2019-09-10 PROBLEM — Z51.81 ENCOUNTER FOR THERAPEUTIC DRUG MONITORING: Status: ACTIVE | Noted: 2019-09-10

## 2019-09-10 PROBLEM — E72.12 MTHFR (METHYLENE THF REDUCTASE) DEFICIENCY AND HOMOCYSTINURIA: Status: ACTIVE | Noted: 2019-09-10

## 2019-09-10 PROBLEM — Z76.0 ENCOUNTER FOR ISSUE OF REPEAT PRESCRIPTION: Status: ACTIVE | Noted: 2019-09-10

## 2019-09-10 PROBLEM — G44.89 CHRONIC MIXED HEADACHE SYNDROME: Status: ACTIVE | Noted: 2017-11-28

## 2019-09-10 PROBLEM — F11.90 CHRONIC, CONTINUOUS USE OF OPIOIDS: Status: ACTIVE | Noted: 2019-09-10

## 2019-09-10 PROBLEM — N93.9 ABNORMAL UTERINE AND VAGINAL BLEEDING, UNSPECIFIED: Status: ACTIVE | Noted: 2019-08-01

## 2019-09-10 PROBLEM — E72.11 MTHFR (METHYLENE THF REDUCTASE) DEFICIENCY AND HOMOCYSTINURIA: Status: ACTIVE | Noted: 2019-09-10

## 2019-10-09 ENCOUNTER — PROCEDURE VISIT (OUTPATIENT)
Dept: NEUROLOGY | Facility: CLINIC | Age: 49
End: 2019-10-09
Payer: MEDICARE

## 2019-10-09 DIAGNOSIS — M79.18 MYOFASCIAL PAIN: ICD-10-CM

## 2019-10-09 PROCEDURE — 99999 PR STA SHADOW: CPT | Mod: PBBFAC,,, | Performed by: NURSE PRACTITIONER

## 2019-10-09 PROCEDURE — 99999 PR STA SHADOW: ICD-10-PCS | Mod: PBBFAC,,, | Performed by: NURSE PRACTITIONER

## 2019-10-09 PROCEDURE — 20553 NJX 1/MLT TRIGGER POINTS 3/>: CPT | Mod: S$PBB | Performed by: NURSE PRACTITIONER

## 2019-10-10 ENCOUNTER — PROCEDURE VISIT (OUTPATIENT)
Dept: NEUROLOGY | Facility: CLINIC | Age: 49
End: 2019-10-10
Payer: MEDICARE

## 2019-10-10 DIAGNOSIS — G24.3 CERVICAL DYSTONIA: Primary | ICD-10-CM

## 2019-10-10 DIAGNOSIS — M54.2 CERVICALGIA: ICD-10-CM

## 2019-10-10 PROCEDURE — 99999 PR STA SHADOW: CPT | Mod: PBBFAC,,, | Performed by: PSYCHIATRY & NEUROLOGY

## 2019-10-10 PROCEDURE — 99999 PR STA SHADOW: ICD-10-PCS | Mod: PBBFAC,,, | Performed by: PSYCHIATRY & NEUROLOGY

## 2019-10-10 PROCEDURE — 64616 CHEMODENERV MUSC NECK DYSTON: CPT | Mod: 50,PBBFAC | Performed by: PSYCHIATRY & NEUROLOGY

## 2019-10-10 RX ADMIN — ONABOTULINUMTOXINA 300 UNITS: 100 INJECTION, POWDER, LYOPHILIZED, FOR SOLUTION INTRADERMAL; INTRAMUSCULAR at 03:10

## 2019-10-10 NOTE — PROCEDURES
Procedures       Date of Procedure: 10/10/19    Reason for Procedure: Chronic Migraine and cervical dystonia      Informed consent was obtained prior to performing this study. Two patient identifiers were confirmed with the patient prior to performing this study. A time out to determine correct patient and and agreement on procedure performed was conducted prior to the injections.   Patient uses  topical ethyl chloride for topical  anaesthetic- she uses this prior to injections with no adverse reactions noted with her topical use. Can continue PRN prior to injections  Procedure Details: After informed consent obtained the patient's head and upper neck was cleansed with alcohol rub and 285 Units of Botox (diluted 1:1) was injected in the following bilateral muscles:   5units in corregator* (over 2 sites), 5 units in Procerus, 20 units Frontalis* (over 4 sites), 20 units in Temporalis* (over 4 sites), 30 units in occipitalis* (over 6 sites), 10 units in each semispinalis capitis, 10 units in each splenius capitus, 10 units in the right longismus and 15 units in the left longismus, 60 units in Trapezius* on the right and 70 units on trapezius on the left(over 8 sites). The remaining 15 units were wasted per her last injection series  Levator muscles were not injected as patient states she had side effects with those injections prior.   The patient tolerated the procedure well with no more than 0.25cc of blood loss. She was observed for several minutes post injection and given a handout from [employer] regarding when and where to seek help if side effects are experienced.     -Note injections are  spread to more sights for Trapezius(same amount of Botox) per her request as this helps  -She had emailed that she had no benefit 10 days after the last injection but states her symptoms improved again as expected after Botox  -Note her prior known cervical radiculopathy, right median and ulnar neuropathies which are not  clinically active .   - She will Continue cervical trigger point injections the same week as Botox q 3 months for cervicalgia. .Lidoderm topical cream given prior for her neck pain PRN  -Continue Botox for chronic migraine and for cervical dystonia. She uses nearly 300 units as above which does help reduce pain from both conditions usually  2.5 months consistently after injection   -She has now been weaned off of hydrocodone via PCP and she is using dry needling as an alternative measure for pain control. Updated spinal xrays ordered by PCP reviewed  -Her anxiety contributes to some of her symptoms and she was encouraged to seek further treatment for this as needed.   -Continue baclofen along with tizanidine for additional muscle relaxation unless sedation with combination use- she has chronically dosed both at bedtime without sedation  RTC in 3 months for  Botox and for cervical trigger point injections.
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
## 2019-10-15 PROBLEM — R53.82 CHRONIC FATIGUE AND MALAISE: Status: ACTIVE | Noted: 2019-10-15

## 2019-10-15 PROBLEM — R53.81 CHRONIC FATIGUE AND MALAISE: Status: ACTIVE | Noted: 2019-10-15

## 2019-10-15 PROBLEM — F43.0 ACUTE STRESS REACTION CAUSING MIXED DISTURBANCE OF EMOTION AND CONDUCT: Status: ACTIVE | Noted: 2019-10-15

## 2019-12-09 ENCOUNTER — PATIENT MESSAGE (OUTPATIENT)
Dept: NEUROLOGY | Facility: CLINIC | Age: 49
End: 2019-12-09

## 2020-01-08 ENCOUNTER — PROCEDURE VISIT (OUTPATIENT)
Dept: NEUROLOGY | Facility: CLINIC | Age: 50
End: 2020-01-08
Payer: MEDICARE

## 2020-01-08 DIAGNOSIS — M54.2 CERVICALGIA: Primary | ICD-10-CM

## 2020-01-08 DIAGNOSIS — G24.3 CERVICAL DYSTONIA: ICD-10-CM

## 2020-01-08 PROCEDURE — 20553 NJX 1/MLT TRIGGER POINTS 3/>: CPT | Mod: S$PBB | Performed by: NURSE PRACTITIONER

## 2020-01-08 PROCEDURE — 99999 PR STA SHADOW: ICD-10-PCS | Mod: PBBFAC,,, | Performed by: NURSE PRACTITIONER

## 2020-01-08 PROCEDURE — 99999 PR STA SHADOW: CPT | Mod: PBBFAC,,, | Performed by: NURSE PRACTITIONER

## 2020-01-08 NOTE — PROCEDURES
Procedures Date: 01/08/2020     Cervical TPI    Trigger points were palpated bilaterally along the right trapezius, Rhomboids, and Levator Scapula. Sites were prepped with alcohol, and injected with 1 mL 0.5% Marcaine, using a 5 mL syringe with a 30 gauge ½ inch needle.     Patient tolerated procedure well and blood loss was minimal.

## 2020-01-09 ENCOUNTER — PROCEDURE VISIT (OUTPATIENT)
Dept: NEUROLOGY | Facility: CLINIC | Age: 50
End: 2020-01-09
Payer: MEDICARE

## 2020-01-09 DIAGNOSIS — G24.3 CERVICAL DYSTONIA: Primary | ICD-10-CM

## 2020-01-09 DIAGNOSIS — M79.18 MYOFASCIAL PAIN: ICD-10-CM

## 2020-01-09 PROCEDURE — 99999 PR STA SHADOW: ICD-10-PCS | Mod: PBBFAC,,, | Performed by: PSYCHIATRY & NEUROLOGY

## 2020-01-09 PROCEDURE — 64616 CHEMODENERV MUSC NECK DYSTON: CPT | Mod: 50,PBBFAC | Performed by: PSYCHIATRY & NEUROLOGY

## 2020-01-09 PROCEDURE — 99999 PR STA SHADOW: CPT | Mod: S$PBB,PBBFAC,, | Performed by: PSYCHIATRY & NEUROLOGY

## 2020-01-09 RX ADMIN — ONABOTULINUMTOXINA 300 UNITS: 100 INJECTION, POWDER, LYOPHILIZED, FOR SOLUTION INTRADERMAL; INTRAMUSCULAR at 02:01

## 2020-01-09 NOTE — PROCEDURES
Procedures     Date of Procedure: 1/9/2020    Reason for Procedure: Chronic Migraine and cervical dystonia      Informed consent was obtained prior to performing this study. Two patient identifiers were confirmed with the patient prior to performing this study. A time out to determine correct patient and and agreement on procedure performed was conducted prior to the injections.   Patient uses  topical ethyl chloride for topical  anaesthetic- she uses this prior to injections with no adverse reactions noted with her topical use. Can continue PRN prior to injections  Procedure Details: After informed consent obtained the patient's head and upper neck was cleansed with alcohol rub and 285 Units of Botox (diluted 1:1) was injected in the following bilateral muscles:   5units in corregator* (over 2 sites), 5 units in Procerus, 20 units Frontalis* (over 4 sites), 20 units in Temporalis* (over 4 sites), 30 units in occipitalis* (over 6 sites), 10 units in each semispinalis capitis, 10 units in each splenius capitus, 10 units in the right longismus and 15 units in the left longismus, 60 units in Trapezius* on the right and 70 units on trapezius on the left(over 8 sites). The remaining 15 units were wasted per her last injection series  Levator muscles were not injected as patient states she had side effects with those injections prior.   The patient tolerated the procedure well with no more than 0.25cc of blood loss. She was observed for several minutes post injection and given a handout from [employer] regarding when and where to seek help if side effects are experienced.     -Note injections are  spread to more sights for Trapezius(same amount of Botox) per her request as this helps  -Note her prior known cervical radiculopathy, right median and ulnar neuropathies which are not clinically active .   - She will Continue cervical trigger point injections the same week as Botox q 3 months for cervicalgia. .Lidoderm topical  cream given prior for her neck pain PRN  -Continue Botox for chronic migraine and for cervical dystonia. She uses nearly 300 units as above which does help reduce pain from both conditions usually  2.5 months consistently after injection   -She has now been weaned off of hydrocodone via PCP and she  used dry needling as an alternative measure for pain control.   -Her anxiety contributes to some of her symptoms/ PCP manages this and this is improved  -Continue baclofen along with tizanidine for additional muscle relaxation unless sedation with combination use- she has chronically dosed both at bedtime without sedation  RTC in 3 months for  Botox and for cervical trigger point injections.

## 2020-04-09 ENCOUNTER — PATIENT MESSAGE (OUTPATIENT)
Dept: NEUROLOGY | Facility: CLINIC | Age: 50
End: 2020-04-09

## 2020-04-09 ENCOUNTER — PROCEDURE VISIT (OUTPATIENT)
Dept: NEUROLOGY | Facility: CLINIC | Age: 50
End: 2020-04-09
Payer: MEDICARE

## 2020-04-09 DIAGNOSIS — G24.3 CERVICAL DYSTONIA: Primary | ICD-10-CM

## 2020-04-09 DIAGNOSIS — G43.719 CHRONIC MIGRAINE WITHOUT AURA, INTRACTABLE, WITHOUT STATUS MIGRAINOSUS: ICD-10-CM

## 2020-04-09 PROCEDURE — 99999 PR STA SHADOW: CPT | Mod: PBBFAC,,, | Performed by: PSYCHIATRY & NEUROLOGY

## 2020-04-09 PROCEDURE — 99999 PR STA SHADOW: ICD-10-PCS | Mod: PBBFAC,,, | Performed by: PSYCHIATRY & NEUROLOGY

## 2020-04-09 PROCEDURE — 64616 CHEMODENERV MUSC NECK DYSTON: CPT | Mod: 50,PBBFAC | Performed by: PSYCHIATRY & NEUROLOGY

## 2020-04-09 RX ADMIN — ONABOTULINUMTOXINA 300 UNITS: 100 INJECTION, POWDER, LYOPHILIZED, FOR SOLUTION INTRADERMAL; INTRAMUSCULAR at 02:04

## 2020-04-09 NOTE — PROCEDURES
Procedures        Date of Procedure: 4/9/2020    Reason for Procedure: Chronic Migraine and cervical dystonia      This procedure is deemed medically necessary today to prevent worsening of this patient's chronic severe pain and to prevent ER and hospital utilization for pain at this time.    Informed consent was obtained prior to performing this study. Two patient identifiers were confirmed with the patient prior to performing this study. A time out to determine correct patient and and agreement on procedure performed was conducted prior to the injections.   Patient uses  topical ethyl chloride for topical  anaesthetic- she uses this prior to injections with no adverse reactions noted with her topical use. Can continue PRN prior to injections  Procedure Details: After informed consent obtained the patient's head and upper neck was cleansed with alcohol rub and 285 Units of Botox (diluted 1:1) was injected in the following bilateral muscles:   5units in corregator* (over 2 sites), 5 units in Procerus, 20 units Frontalis* (over 4 sites), 20 units in Temporalis* (over 4 sites), 30 units in occipitalis* (over 6 sites), 10 units in each semispinalis capitis, 10 units in each splenius capitus, 10 units in the right longismus and 15 units in the left longismus, 60 units in Trapezius* on the right and 70 units on trapezius on the left(over 8 sites). The remaining 15 units were wasted per her last injection series  Levator muscles were not injected as patient states she had side effects with those injections prior.   The patient tolerated the procedure well with no more than 0.25cc of blood loss. She was observed for several minutes post injection and given a handout from [employer] regarding when and where to seek help if side effects are experienced.     -Note injections are  spread to more sights for Trapezius(same amount of Botox) per her request as this helps  -Note her prior known cervical radiculopathy, right median  and ulnar neuropathies which are not clinically active .   - She will Continue cervical trigger point injections the same week as Botox q 3 months for cervicalgia. .Lidoderm topical cream given prior for her neck pain PRN  -Continue Botox for chronic migraine and for cervical dystonia. She uses nearly 300 units as above which does help reduce pain from both conditions usually  2.5 months consistently after injection   -She has now been weaned off of hydrocodone via PCP and she  used dry needling as an alternative measure for pain control.   -Her anxiety contributes to some of her symptoms/ PCP manages this and this is improved  -Continue baclofen along with tizanidine for additional muscle relaxation unless sedation with combination use- she has chronically dosed both at bedtime without sedation  RTC in 3 months for  Botox and for cervical trigger point injections.

## 2020-04-14 ENCOUNTER — PROCEDURE VISIT (OUTPATIENT)
Dept: NEUROLOGY | Facility: CLINIC | Age: 50
End: 2020-04-14
Payer: MEDICARE

## 2020-04-14 DIAGNOSIS — M79.18 MYOFASCIAL PAIN: ICD-10-CM

## 2020-04-14 PROCEDURE — 99999 PR STA SHADOW: CPT | Mod: PBBFAC,,, | Performed by: NURSE PRACTITIONER

## 2020-04-14 PROCEDURE — 99999 PR STA SHADOW: ICD-10-PCS | Mod: PBBFAC,,, | Performed by: NURSE PRACTITIONER

## 2020-04-14 PROCEDURE — 20553 NJX 1/MLT TRIGGER POINTS 3/>: CPT | Mod: S$PBB | Performed by: NURSE PRACTITIONER

## 2020-04-14 NOTE — PROCEDURES
Procedures Date: 04/14/2020     Cervical TPI    Trigger points were palpated bilaterally along the Bilateral trapezius, Rhomboids, and Levator Scapulae. Sites were prepped with alcohol, and injected with 1 mL 0.5% Marcaine, using a 5 mL syringe with a 30 gauge ½ inch needle.     Patient tolerated procedure well and blood loss was minimal.
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
## 2020-05-12 PROBLEM — N93.9 ABNORMAL UTERINE AND VAGINAL BLEEDING, UNSPECIFIED: Status: RESOLVED | Noted: 2019-08-01 | Resolved: 2020-05-12

## 2020-05-12 PROBLEM — F11.90 CHRONIC, CONTINUOUS USE OF OPIOIDS: Status: RESOLVED | Noted: 2019-09-10 | Resolved: 2020-05-12

## 2020-07-08 ENCOUNTER — PROCEDURE VISIT (OUTPATIENT)
Dept: NEUROLOGY | Facility: CLINIC | Age: 50
End: 2020-07-08
Payer: MEDICARE

## 2020-07-08 DIAGNOSIS — M54.2 NECK PAIN: Primary | ICD-10-CM

## 2020-07-08 PROCEDURE — 99999 PR STA SHADOW: CPT | Mod: PBBFAC,,, | Performed by: NURSE PRACTITIONER

## 2020-07-08 PROCEDURE — 99999 PR STA SHADOW: ICD-10-PCS | Mod: PBBFAC,,, | Performed by: NURSE PRACTITIONER

## 2020-07-08 PROCEDURE — 20553 NJX 1/MLT TRIGGER POINTS 3/>: CPT | Mod: S$PBB | Performed by: NURSE PRACTITIONER

## 2020-07-08 NOTE — PROCEDURES
Procedures Date: 07/08/2020     Cervical TPI    Trigger points were palpated along the left trapezius, Rhomboid, and Levator Scapulae. Sites were prepped with alcohol, and injected with 1 mL 0.5% Marcaine, using a 5 mL syringe with a 30 gauge ½ inch needle.     Patient tolerated procedure well and blood loss was minimal.

## 2020-07-09 ENCOUNTER — PROCEDURE VISIT (OUTPATIENT)
Dept: NEUROLOGY | Facility: CLINIC | Age: 50
End: 2020-07-09
Payer: MEDICARE

## 2020-07-09 DIAGNOSIS — G24.3 CERVICAL DYSTONIA: ICD-10-CM

## 2020-07-09 DIAGNOSIS — G24.3 CERVICAL DYSTONIA: Primary | ICD-10-CM

## 2020-07-09 DIAGNOSIS — M79.18 MYOFASCIAL PAIN: Primary | ICD-10-CM

## 2020-07-09 DIAGNOSIS — M79.7 FIBROMYALGIA: ICD-10-CM

## 2020-07-09 PROCEDURE — 99999 PR STA SHADOW: CPT | Mod: S$PBB,PBBFAC,, | Performed by: PSYCHIATRY & NEUROLOGY

## 2020-07-09 PROCEDURE — 99999 PR STA SHADOW: CPT | Mod: PBBFAC,,, | Performed by: PSYCHIATRY & NEUROLOGY

## 2020-07-09 PROCEDURE — 99999 PR STA SHADOW: ICD-10-PCS | Mod: S$PBB,PBBFAC,, | Performed by: PSYCHIATRY & NEUROLOGY

## 2020-07-09 PROCEDURE — 64616 CHEMODENERV MUSC NECK DYSTON: CPT | Mod: 50,PBBFAC | Performed by: PSYCHIATRY & NEUROLOGY

## 2020-07-09 RX ADMIN — ONABOTULINUMTOXINA 300 UNITS: 100 INJECTION, POWDER, LYOPHILIZED, FOR SOLUTION INTRADERMAL; INTRAMUSCULAR at 03:07

## 2020-07-09 NOTE — PROCEDURES
Procedures         Date of Procedure: 7/9/2020    Reason for Procedure: Chronic Migraine and cervical dystonia      This procedure is deemed medically necessary today to prevent worsening of this patient's chronic severe pain and to prevent ER and hospital utilization for pain at this time.     Informed consent was obtained prior to performing this study. Two patient identifiers were confirmed with the patient prior to performing this study. A time out to determine correct patient and and agreement on procedure performed was conducted prior to the injections.   Patient uses  topical ethyl chloride for topical  anaesthetic- she uses this prior to injections with no adverse reactions noted with her topical use. Can continue PRN prior to injections  Procedure Details: After informed consent obtained the patient's head and upper neck was cleansed with alcohol rub and 285 Units of Botox (diluted 1:1) was injected in the following bilateral muscles:   5units in corregator* (over 2 sites), 5 units in Procerus, 20 units Frontalis* (over 4 sites), 20 units in Temporalis* (over 4 sites), 30 units in occipitalis* (over 6 sites), 10 units in each semispinalis capitis, 10 units in each splenius capitus, 10 units in the right longismus and 15 units in the left longismus, 60 units in Trapezius* on the right and 70 units on trapezius on the left(over 8 sites). The remaining 15 units were wasted per her last injection series  Levator muscles were not injected as patient states she had side effects with those injections prior.   The patient tolerated the procedure well with no more than 0.25cc of blood loss. She was observed for several minutes post injection and given a handout from [employer] regarding when and where to seek help if side effects are experienced.     -Note injections are  spread to more sights for Trapezius(same amount of Botox) per her request as this helps  -Note her prior known cervical radiculopathy, right median  and ulnar neuropathies which are not clinically active .   - She will Continue cervical trigger point injections the same week as Botox q 3 months for cervicalgia. Lidoderm topical cream given prior for her neck pain PRN  -Continue Botox for chronic migraine and for cervical dystonia. She uses nearly 300 units as above which does help reduce pain from both conditions usually  2.5 months consistently after injection   -She has now been weaned off of hydrocodone via PCP and she  used dry needling as an alternative measure for pain control.   -Her anxiety contributes to some of her symptoms/ PCP manages this and this is improved  -Continue baclofen along with tizanidine for additional muscle relaxation unless sedation with combination use- she has chronically dosed both at bedtime without sedation  RTC in 3 months for  Botox and for cervical trigger point injections.

## 2020-07-21 RX ORDER — BACLOFEN 10 MG/1
10 TABLET ORAL EVERY 4 HOURS
Qty: 540 TABLET | Refills: 3 | Status: SHIPPED | OUTPATIENT
Start: 2020-07-21 | End: 2021-05-31 | Stop reason: SDUPTHER

## 2020-10-14 ENCOUNTER — PROCEDURE VISIT (OUTPATIENT)
Dept: NEUROLOGY | Facility: CLINIC | Age: 50
End: 2020-10-14
Payer: MEDICARE

## 2020-10-14 DIAGNOSIS — G24.3 CERVICAL DYSTONIA: Primary | ICD-10-CM

## 2020-10-14 PROCEDURE — 99999 PR STA SHADOW: ICD-10-PCS | Mod: PBBFAC,,, | Performed by: NURSE PRACTITIONER

## 2020-10-14 PROCEDURE — 99999 PR STA SHADOW: CPT | Mod: PBBFAC,,, | Performed by: NURSE PRACTITIONER

## 2020-10-14 PROCEDURE — 20553 NJX 1/MLT TRIGGER POINTS 3/>: CPT | Mod: PBBFAC | Performed by: NURSE PRACTITIONER

## 2020-10-14 NOTE — PROCEDURES
Procedures Date: 10/14/2020     Cervical TPI    Trigger points were palpated bilaterally along the Bilateral trapezius, Rhomboids, and Levator Scapulae. Sites were prepped with alcohol, and injected with 1 mL 0.5% Marcaine, using a 5 mL syringe with a 30 gauge ½ inch needle.     Patient tolerated procedure well and blood loss was minimal.

## 2020-10-15 ENCOUNTER — PROCEDURE VISIT (OUTPATIENT)
Dept: NEUROLOGY | Facility: CLINIC | Age: 50
End: 2020-10-15
Payer: MEDICARE

## 2020-10-15 DIAGNOSIS — M79.18 MYOFASCIAL PAIN: ICD-10-CM

## 2020-10-15 DIAGNOSIS — G43.719 CHRONIC MIGRAINE WITHOUT AURA, INTRACTABLE, WITHOUT STATUS MIGRAINOSUS: ICD-10-CM

## 2020-10-15 DIAGNOSIS — G24.3 CERVICAL DYSTONIA: Primary | ICD-10-CM

## 2020-10-15 PROCEDURE — 64616 CHEMODENERV MUSC NECK DYSTON: CPT | Mod: 50,PBBFAC | Performed by: PSYCHIATRY & NEUROLOGY

## 2020-10-15 PROCEDURE — 99999 PR STA SHADOW: ICD-10-PCS | Mod: PBBFAC,,, | Performed by: PSYCHIATRY & NEUROLOGY

## 2020-10-15 PROCEDURE — 99999 PR STA SHADOW: CPT | Mod: S$PBB,PBBFAC,, | Performed by: PSYCHIATRY & NEUROLOGY

## 2020-10-15 RX ADMIN — ONABOTULINUMTOXINA 300 UNITS: 100 INJECTION, POWDER, LYOPHILIZED, FOR SOLUTION INTRADERMAL; INTRAMUSCULAR at 03:10

## 2020-10-15 NOTE — PROCEDURES
Procedures     Date of Procedure: 10/15/2020    Reason for Procedure: Chronic Migraine and cervical dystonia      This procedure is deemed medically necessary today to prevent worsening of this patient's chronic severe pain and to prevent ER and hospital utilization for pain at this time.     Informed consent was obtained prior to performing this study. Two patient identifiers were confirmed with the patient prior to performing this study. A time out to determine correct patient and and agreement on procedure performed was conducted prior to the injections.   Patient uses  topical ethyl chloride for topical  anaesthetic- she uses this prior to injections with no adverse reactions noted with her topical use. Can continue PRN prior to injections  Procedure Details: After informed consent obtained the patient's head and upper neck was cleansed with alcohol rub and 285 Units of Botox (diluted 1:1) was injected in the following bilateral muscles:   5units in corregator* (over 2 sites), 5 units in Procerus, 20 units Frontalis* (over 4 sites), 20 units in Temporalis* (over 4 sites), 30 units in occipitalis* (over 6 sites), 10 units in each semispinalis capitis, 10 units in each splenius capitus, 10 units in the right longismus and 15 units in the left longismus, 60 units in Trapezius* on the right and 70 units on trapezius on the left(over 8 sites). The remaining 15 units were wasted per her last injection series  Levator muscles were not injected as patient states she had side effects with those injections prior.   The patient tolerated the procedure well with no more than 0.25cc of blood loss. She was observed for several minutes post injection and given a handout from [employer] regarding when and where to seek help if side effects are experienced.     -Note injections are  spread to more sights for Trapezius(same amount of Botox) per her request as this helps  -Note her prior known cervical radiculopathy, right median  and ulnar neuropathies which are not clinically active .   - She will Continue cervical trigger point injections the same week as Botox q 3 months for cervicalgia. Lidoderm topical cream given prior for her neck pain PRN  -Continue Botox for chronic migraine and for cervical dystonia. She uses nearly 300 units as above which does help reduce pain from both conditions usually  2.5 months consistently after injection   -She has now been weaned off of hydrocodone via PCP and she  used dry needling as an alternative measure for pain control.   -Her anxiety contributes to some of her symptoms/ PCP manages this   -Continue baclofen along with tizanidine for additional muscle relaxation unless sedation with combination use- she has chronically dosed both at bedtime without sedation  RTC in 3 months for  Botox and for cervical trigger point injections.

## 2020-11-17 PROBLEM — D75.89 MACROCYTOSIS WITHOUT ANEMIA: Status: ACTIVE | Noted: 2020-11-17

## 2020-12-07 RX ORDER — TIZANIDINE 4 MG/1
4 TABLET ORAL 3 TIMES DAILY
Qty: 270 TABLET | Refills: 3 | Status: SHIPPED | OUTPATIENT
Start: 2020-12-07 | End: 2021-05-31 | Stop reason: SDUPTHER

## 2021-01-13 ENCOUNTER — PROCEDURE VISIT (OUTPATIENT)
Dept: NEUROLOGY | Facility: CLINIC | Age: 51
End: 2021-01-13
Payer: MEDICARE

## 2021-01-13 DIAGNOSIS — M79.18 MYOFASCIAL PAIN: ICD-10-CM

## 2021-01-13 PROCEDURE — 99999 PR STA SHADOW: ICD-10-PCS | Mod: PBBFAC,,, | Performed by: NURSE PRACTITIONER

## 2021-01-13 PROCEDURE — 99999 PR STA SHADOW: CPT | Mod: PBBFAC,,, | Performed by: NURSE PRACTITIONER

## 2021-01-13 PROCEDURE — 20553 NJX 1/MLT TRIGGER POINTS 3/>: CPT | Mod: S$PBB | Performed by: NURSE PRACTITIONER

## 2021-01-14 ENCOUNTER — PROCEDURE VISIT (OUTPATIENT)
Dept: NEUROLOGY | Facility: CLINIC | Age: 51
End: 2021-01-14
Payer: MEDICARE

## 2021-01-14 DIAGNOSIS — G24.3 CERVICAL DYSTONIA: Primary | ICD-10-CM

## 2021-01-14 DIAGNOSIS — M79.18 MYOFASCIAL PAIN: ICD-10-CM

## 2021-01-14 PROCEDURE — 99999 PR STA SHADOW: CPT | Mod: PBBFAC,,, | Performed by: PSYCHIATRY & NEUROLOGY

## 2021-01-14 PROCEDURE — 99999 PR STA SHADOW: ICD-10-PCS | Mod: PBBFAC,,, | Performed by: PSYCHIATRY & NEUROLOGY

## 2021-01-14 PROCEDURE — 99999 PR STA SHADOW: CPT | Mod: S$PBB,PBBFAC,, | Performed by: PSYCHIATRY & NEUROLOGY

## 2021-01-14 PROCEDURE — 64616 CHEMODENERV MUSC NECK DYSTON: CPT | Mod: 50,S$PBB | Performed by: PSYCHIATRY & NEUROLOGY

## 2021-01-14 RX ADMIN — ONABOTULINUMTOXINA 300 UNITS: 100 INJECTION, POWDER, LYOPHILIZED, FOR SOLUTION INTRADERMAL; INTRAMUSCULAR at 02:01

## 2021-04-14 ENCOUNTER — PROCEDURE VISIT (OUTPATIENT)
Dept: NEUROLOGY | Facility: CLINIC | Age: 51
End: 2021-04-14
Payer: MEDICARE

## 2021-04-14 DIAGNOSIS — M54.2 NECK PAIN: ICD-10-CM

## 2021-04-14 PROCEDURE — 99999 PR STA SHADOW: CPT | Mod: PBBFAC,,, | Performed by: NURSE PRACTITIONER

## 2021-04-14 PROCEDURE — 99999 PR STA SHADOW: ICD-10-PCS | Mod: PBBFAC,,, | Performed by: NURSE PRACTITIONER

## 2021-04-14 PROCEDURE — 20553 NJX 1/MLT TRIGGER POINTS 3/>: CPT | Mod: PBBFAC | Performed by: NURSE PRACTITIONER

## 2021-04-15 ENCOUNTER — PROCEDURE VISIT (OUTPATIENT)
Dept: NEUROLOGY | Facility: CLINIC | Age: 51
End: 2021-04-15
Payer: MEDICARE

## 2021-04-15 ENCOUNTER — TELEPHONE (OUTPATIENT)
Dept: NEUROLOGY | Facility: CLINIC | Age: 51
End: 2021-04-15

## 2021-04-15 DIAGNOSIS — G24.3 CERVICAL DYSTONIA: Primary | ICD-10-CM

## 2021-04-15 DIAGNOSIS — M54.2 NECK PAIN: Primary | ICD-10-CM

## 2021-04-15 PROCEDURE — 99999 PR STA SHADOW: CPT | Mod: PBBFAC,,, | Performed by: PSYCHIATRY & NEUROLOGY

## 2021-04-15 PROCEDURE — 99999 PR STA SHADOW: ICD-10-PCS | Mod: PBBFAC,,, | Performed by: PSYCHIATRY & NEUROLOGY

## 2021-04-15 PROCEDURE — 64616 CHEMODENERV MUSC NECK DYSTON: CPT | Mod: 50,S$PBB | Performed by: PSYCHIATRY & NEUROLOGY

## 2021-04-15 RX ADMIN — ONABOTULINUMTOXINA 300 UNITS: 100 INJECTION, POWDER, LYOPHILIZED, FOR SOLUTION INTRADERMAL; INTRAMUSCULAR at 03:04

## 2021-05-06 ENCOUNTER — PATIENT MESSAGE (OUTPATIENT)
Dept: NEUROLOGY | Facility: CLINIC | Age: 51
End: 2021-05-06

## 2021-05-06 DIAGNOSIS — M54.2 NECK PAIN: Primary | ICD-10-CM

## 2021-05-07 ENCOUNTER — HOSPITAL ENCOUNTER (EMERGENCY)
Facility: HOSPITAL | Age: 51
Discharge: HOME | End: 2021-05-07
Attending: EMERGENCY MEDICINE
Payer: COMMERCIAL

## 2021-05-07 VITALS
DIASTOLIC BLOOD PRESSURE: 74 MMHG | TEMPERATURE: 98 F | RESPIRATION RATE: 20 BRPM | SYSTOLIC BLOOD PRESSURE: 123 MMHG | OXYGEN SATURATION: 96 % | HEART RATE: 98 BPM | BODY MASS INDEX: 27.24 KG/M2 | WEIGHT: 173.94 LBS
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
## 2021-05-07 DIAGNOSIS — S39.012A STRAIN OF LUMBAR REGION, INITIAL ENCOUNTER: ICD-10-CM

## 2021-05-07 PROCEDURE — 99283 EMERGENCY DEPT VISIT LOW MDM: CPT | Mod: 25

## 2021-05-07 RX ORDER — HYDROCODONE BITARTRATE AND ACETAMINOPHEN 5; 325 MG/1; MG/1
1 TABLET ORAL EVERY 4 HOURS PRN
Qty: 15 TABLET | Refills: 0 | Status: SHIPPED | OUTPATIENT
Start: 2021-05-07 | End: 2023-02-27

## 2021-05-12 RX ORDER — SENNOSIDES 25 MG/1
TABLET, FILM COATED ORAL
Qty: 45 G | Refills: 11 | Status: SHIPPED | OUTPATIENT
Start: 2021-05-12 | End: 2022-05-12 | Stop reason: SDUPTHER

## 2021-05-13 ENCOUNTER — PROCEDURE VISIT (OUTPATIENT)
Dept: NEUROLOGY | Facility: CLINIC | Age: 51
End: 2021-05-13
Payer: MEDICARE

## 2021-05-13 DIAGNOSIS — M54.2 NECK PAIN: ICD-10-CM

## 2021-05-13 PROCEDURE — 99999 PR STA SHADOW: ICD-10-PCS | Mod: PBBFAC,,, | Performed by: NURSE PRACTITIONER

## 2021-05-13 PROCEDURE — 99999 PR STA SHADOW: CPT | Mod: PBBFAC,,, | Performed by: NURSE PRACTITIONER

## 2021-05-13 PROCEDURE — 20553 NJX 1/MLT TRIGGER POINTS 3/>: CPT | Mod: S$PBB | Performed by: NURSE PRACTITIONER

## 2021-05-17 ENCOUNTER — PATIENT MESSAGE (OUTPATIENT)
Dept: NEUROLOGY | Facility: CLINIC | Age: 51
End: 2021-05-17

## 2021-05-17 DIAGNOSIS — M54.2 NECK PAIN: Primary | ICD-10-CM

## 2021-05-20 ENCOUNTER — PROCEDURE VISIT (OUTPATIENT)
Dept: NEUROLOGY | Facility: CLINIC | Age: 51
End: 2021-05-20
Payer: MEDICARE

## 2021-05-20 DIAGNOSIS — M54.2 NECK PAIN: ICD-10-CM

## 2021-05-20 DIAGNOSIS — M54.2 NECK PAIN: Primary | ICD-10-CM

## 2021-05-20 PROCEDURE — 99999 PR STA SHADOW: ICD-10-PCS | Mod: PBBFAC,,, | Performed by: NURSE PRACTITIONER

## 2021-05-20 PROCEDURE — 20553 NJX 1/MLT TRIGGER POINTS 3/>: CPT | Mod: PBBFAC | Performed by: NURSE PRACTITIONER

## 2021-05-20 PROCEDURE — 99999 PR STA SHADOW: CPT | Mod: PBBFAC,,, | Performed by: NURSE PRACTITIONER

## 2021-05-26 ENCOUNTER — PROCEDURE VISIT (OUTPATIENT)
Dept: NEUROLOGY | Facility: CLINIC | Age: 51
End: 2021-05-26
Payer: MEDICARE

## 2021-05-26 DIAGNOSIS — M54.2 NECK PAIN: ICD-10-CM

## 2021-05-26 PROCEDURE — 99999 PR STA SHADOW: ICD-10-PCS | Mod: PBBFAC,,, | Performed by: NURSE PRACTITIONER

## 2021-05-26 PROCEDURE — 99999 PR STA SHADOW: CPT | Mod: PBBFAC,,, | Performed by: NURSE PRACTITIONER

## 2021-05-26 PROCEDURE — 20553 NJX 1/MLT TRIGGER POINTS 3/>: CPT | Mod: S$PBB | Performed by: NURSE PRACTITIONER

## 2021-05-28 ENCOUNTER — PATIENT MESSAGE (OUTPATIENT)
Dept: NEUROLOGY | Facility: CLINIC | Age: 51
End: 2021-05-28

## 2021-05-28 DIAGNOSIS — M54.2 NECK PAIN: Primary | ICD-10-CM

## 2021-06-03 ENCOUNTER — PROCEDURE VISIT (OUTPATIENT)
Dept: NEUROLOGY | Facility: CLINIC | Age: 51
End: 2021-06-03
Payer: MEDICARE

## 2021-06-03 DIAGNOSIS — M54.2 NECK PAIN: ICD-10-CM

## 2021-06-03 PROCEDURE — 99999 PR STA SHADOW: CPT | Mod: PBBFAC,,, | Performed by: NURSE PRACTITIONER

## 2021-06-03 PROCEDURE — 99999 PR STA SHADOW: ICD-10-PCS | Mod: PBBFAC,,, | Performed by: NURSE PRACTITIONER

## 2021-06-03 PROCEDURE — 20553 NJX 1/MLT TRIGGER POINTS 3/>: CPT | Mod: S$PBB | Performed by: NURSE PRACTITIONER

## 2021-06-08 ENCOUNTER — PATIENT MESSAGE (OUTPATIENT)
Dept: NEUROLOGY | Facility: CLINIC | Age: 51
End: 2021-06-08

## 2021-06-08 DIAGNOSIS — M54.2 NECK PAIN: Primary | ICD-10-CM

## 2021-06-17 ENCOUNTER — PROCEDURE VISIT (OUTPATIENT)
Dept: NEUROLOGY | Facility: CLINIC | Age: 51
End: 2021-06-17
Payer: MEDICARE

## 2021-06-17 DIAGNOSIS — M54.2 NECK PAIN: ICD-10-CM

## 2021-06-17 PROCEDURE — 99999 PR STA SHADOW: ICD-10-PCS | Mod: PBBFAC,,, | Performed by: NURSE PRACTITIONER

## 2021-06-17 PROCEDURE — 99999 PR STA SHADOW: CPT | Mod: PBBFAC,,, | Performed by: NURSE PRACTITIONER

## 2021-06-17 PROCEDURE — 20553 NJX 1/MLT TRIGGER POINTS 3/>: CPT | Mod: PBBFAC | Performed by: NURSE PRACTITIONER

## 2021-06-23 ENCOUNTER — PATIENT MESSAGE (OUTPATIENT)
Dept: NEUROLOGY | Facility: CLINIC | Age: 51
End: 2021-06-23

## 2021-06-23 DIAGNOSIS — M54.2 NECK PAIN: Primary | ICD-10-CM

## 2021-06-30 ENCOUNTER — PROCEDURE VISIT (OUTPATIENT)
Dept: NEUROLOGY | Facility: CLINIC | Age: 51
End: 2021-06-30
Payer: MEDICARE

## 2021-06-30 DIAGNOSIS — M54.2 NECK PAIN: ICD-10-CM

## 2021-06-30 PROCEDURE — 99999 PR STA SHADOW: CPT | Mod: PBBFAC,,, | Performed by: NURSE PRACTITIONER

## 2021-06-30 PROCEDURE — 20553 NJX 1/MLT TRIGGER POINTS 3/>: CPT | Mod: PBBFAC | Performed by: NURSE PRACTITIONER

## 2021-06-30 PROCEDURE — 99999 PR STA SHADOW: ICD-10-PCS | Mod: PBBFAC,,, | Performed by: NURSE PRACTITIONER

## 2021-07-14 ENCOUNTER — PROCEDURE VISIT (OUTPATIENT)
Dept: NEUROLOGY | Facility: CLINIC | Age: 51
End: 2021-07-14
Payer: MEDICARE

## 2021-07-14 DIAGNOSIS — M54.2 NECK PAIN: ICD-10-CM

## 2021-07-14 PROCEDURE — 20553 NJX 1/MLT TRIGGER POINTS 3/>: CPT | Mod: PBBFAC | Performed by: NURSE PRACTITIONER

## 2021-07-14 PROCEDURE — 99999 PR STA SHADOW: ICD-10-PCS | Mod: PBBFAC,,, | Performed by: NURSE PRACTITIONER

## 2021-07-14 PROCEDURE — 99999 PR STA SHADOW: CPT | Mod: PBBFAC,,, | Performed by: NURSE PRACTITIONER

## 2021-07-16 ENCOUNTER — PROCEDURE VISIT (OUTPATIENT)
Dept: NEUROLOGY | Facility: CLINIC | Age: 51
End: 2021-07-16
Payer: MEDICARE

## 2021-07-16 DIAGNOSIS — G24.3 CERVICAL DYSTONIA: Primary | ICD-10-CM

## 2021-07-16 PROCEDURE — 64615 CHEMODENERV MUSC MIGRAINE: CPT | Mod: PBBFAC,50 | Performed by: PSYCHIATRY & NEUROLOGY

## 2021-07-16 PROCEDURE — 99999 PR STA SHADOW: ICD-10-PCS | Mod: PBBFAC,,, | Performed by: PSYCHIATRY & NEUROLOGY

## 2021-07-16 PROCEDURE — 99999 PR STA SHADOW: CPT | Mod: PBBFAC,,, | Performed by: PSYCHIATRY & NEUROLOGY

## 2021-07-16 PROCEDURE — 64616 CHEMODENERV MUSC NECK DYSTON: CPT | Mod: 50,PBBFAC | Performed by: PSYCHIATRY & NEUROLOGY

## 2021-07-16 RX ADMIN — ONABOTULINUMTOXINA 300 UNITS: 100 INJECTION, POWDER, LYOPHILIZED, FOR SOLUTION INTRADERMAL; INTRAMUSCULAR at 09:07

## 2021-07-28 ENCOUNTER — PATIENT MESSAGE (OUTPATIENT)
Dept: NEUROLOGY | Facility: CLINIC | Age: 51
End: 2021-07-28

## 2021-07-29 RX ORDER — BACLOFEN 10 MG/1
10 TABLET ORAL EVERY 4 HOURS
COMMUNITY
Start: 2021-07-09 | End: 2021-07-29 | Stop reason: SDUPTHER

## 2021-07-29 RX ORDER — BACLOFEN 10 MG/1
10 TABLET ORAL 3 TIMES DAILY
Qty: 90 TABLET | Refills: 1 | Status: SHIPPED | OUTPATIENT
Start: 2021-07-29 | End: 2021-10-11

## 2021-10-08 ENCOUNTER — PATIENT MESSAGE (OUTPATIENT)
Dept: NEUROLOGY | Facility: CLINIC | Age: 51
End: 2021-10-08

## 2021-10-11 DIAGNOSIS — H10.11 ALLERGIC RHINOCONJUNCTIVITIS OF RIGHT EYE: ICD-10-CM

## 2021-10-11 DIAGNOSIS — H57.89 EYE DRAINAGE: ICD-10-CM

## 2021-10-11 DIAGNOSIS — R05.9 COUGH: ICD-10-CM

## 2021-10-11 DIAGNOSIS — J30.2 SEASONAL ALLERGIC RHINITIS, UNSPECIFIED TRIGGER: ICD-10-CM

## 2021-10-11 DIAGNOSIS — G24.9 DYSTONIA: Primary | ICD-10-CM

## 2021-10-11 DIAGNOSIS — J30.9 ALLERGIC RHINOCONJUNCTIVITIS OF RIGHT EYE: ICD-10-CM

## 2021-10-11 RX ORDER — BACLOFEN 10 MG/1
TABLET ORAL
Qty: 540 TABLET | Refills: 0 | Status: SHIPPED | OUTPATIENT
Start: 2021-10-11 | End: 2022-01-06 | Stop reason: SDUPTHER

## 2021-10-11 RX ORDER — BACLOFEN 10 MG/1
10 TABLET ORAL 4 TIMES DAILY
Qty: 540 TABLET | Refills: 0 | Status: SHIPPED | OUTPATIENT
Start: 2021-10-11 | End: 2021-10-11 | Stop reason: SDUPTHER

## 2021-10-20 ENCOUNTER — PROCEDURE VISIT (OUTPATIENT)
Dept: NEUROLOGY | Facility: CLINIC | Age: 51
End: 2021-10-20
Payer: MEDICARE

## 2021-10-20 DIAGNOSIS — M54.2 NECK PAIN: Primary | ICD-10-CM

## 2021-10-20 PROCEDURE — 99999 PR STA SHADOW: CPT | Mod: PBBFAC,,, | Performed by: NURSE PRACTITIONER

## 2021-10-20 PROCEDURE — 20553 NJX 1/MLT TRIGGER POINTS 3/>: CPT | Mod: S$PBB | Performed by: NURSE PRACTITIONER

## 2021-10-20 PROCEDURE — 99999 PR STA SHADOW: ICD-10-PCS | Mod: PBBFAC,,, | Performed by: NURSE PRACTITIONER

## 2021-10-21 ENCOUNTER — PATIENT MESSAGE (OUTPATIENT)
Dept: NEUROLOGY | Facility: CLINIC | Age: 51
End: 2021-10-21

## 2021-10-21 ENCOUNTER — PROCEDURE VISIT (OUTPATIENT)
Dept: NEUROLOGY | Facility: CLINIC | Age: 51
End: 2021-10-21
Payer: MEDICARE

## 2021-10-21 DIAGNOSIS — M54.2 NECK PAIN: Primary | ICD-10-CM

## 2021-10-21 DIAGNOSIS — G24.3 CERVICAL DYSTONIA: Primary | ICD-10-CM

## 2021-10-21 PROCEDURE — 99999 PR STA SHADOW: ICD-10-PCS | Mod: PBBFAC,,, | Performed by: PSYCHIATRY & NEUROLOGY

## 2021-10-21 PROCEDURE — 99999 PR STA SHADOW: CPT | Mod: PBBFAC,,, | Performed by: PSYCHIATRY & NEUROLOGY

## 2021-10-21 PROCEDURE — 64616 CHEMODENERV MUSC NECK DYSTON: CPT | Mod: 50,S$PBB | Performed by: PSYCHIATRY & NEUROLOGY

## 2021-10-21 RX ADMIN — ONABOTULINUMTOXINA 100 UNITS: 100 INJECTION, POWDER, LYOPHILIZED, FOR SOLUTION INTRADERMAL; INTRAMUSCULAR at 01:10

## 2022-01-06 DIAGNOSIS — G24.9 DYSTONIA: ICD-10-CM

## 2022-01-06 RX ORDER — TIZANIDINE 4 MG/1
8 TABLET ORAL NIGHTLY PRN
COMMUNITY
End: 2022-01-06 | Stop reason: SDUPTHER

## 2022-01-06 RX ORDER — TIZANIDINE 4 MG/1
8 TABLET ORAL NIGHTLY PRN
Qty: 60 TABLET | Refills: 3 | Status: SHIPPED | OUTPATIENT
Start: 2022-01-06 | End: 2022-10-26

## 2022-01-06 RX ORDER — BACLOFEN 10 MG/1
TABLET ORAL
Qty: 540 TABLET | Refills: 1 | Status: SHIPPED | OUTPATIENT
Start: 2022-01-06 | End: 2022-08-01

## 2022-01-06 NOTE — TELEPHONE ENCOUNTER
"I received a refill request yesterday for Tizanidine 4 mg tid. Did not see on the med card but did note in the chart that she does take it along with Baclofen but amount was different.    Per procedure note of 10/21/2021:   "Continue Baclofen- has been stable with 50mg nightly in addition to 2 tablets of  Tizanidine at night instead of daytime dosing. Explained the risk of sedation. She uses this for sleep with muscle pain at night and functions well on combination dose.   Robaxin used prior"    How would you like this sent?      "

## 2022-01-26 ENCOUNTER — PROCEDURE VISIT (OUTPATIENT)
Dept: NEUROLOGY | Facility: CLINIC | Age: 52
End: 2022-01-26
Payer: MEDICARE

## 2022-01-26 DIAGNOSIS — M54.2 NECK PAIN: ICD-10-CM

## 2022-01-26 PROCEDURE — 20553 NJX 1/MLT TRIGGER POINTS 3/>: CPT | Mod: PBBFAC | Performed by: NURSE PRACTITIONER

## 2022-01-26 PROCEDURE — 99999 PR STA SHADOW: ICD-10-PCS | Mod: PBBFAC,,, | Performed by: NURSE PRACTITIONER

## 2022-01-26 PROCEDURE — 99999 PR STA SHADOW: CPT | Mod: PBBFAC,,, | Performed by: NURSE PRACTITIONER

## 2022-01-26 NOTE — PROCEDURES
Procedures Date: 01/26/2022     Cervical TPI    Trigger points were palpated bilaterally along the Bilateral trapezius, Rhomboids, and Levator Scapulae. Sites were prepped with alcohol, and injected with 1 mL 0.5% Marcaine, using a 5 mL syringe with a 30 gauge ½ inch needle.     Patient tolerated procedure well and blood loss was minimal.     TPI's were medically necessary to treat her neck pain today. Palpable trigger points noted on exam. Twitch response noted.

## 2022-01-27 ENCOUNTER — PROCEDURE VISIT (OUTPATIENT)
Dept: NEUROLOGY | Facility: CLINIC | Age: 52
End: 2022-01-27
Payer: MEDICARE

## 2022-01-27 DIAGNOSIS — M79.18 MYOFASCIAL PAIN: ICD-10-CM

## 2022-01-27 DIAGNOSIS — G24.3 CERVICAL DYSTONIA: Primary | ICD-10-CM

## 2022-01-27 PROCEDURE — 64616 CHEMODENERV MUSC NECK DYSTON: CPT | Mod: 50,PBBFAC | Performed by: PSYCHIATRY & NEUROLOGY

## 2022-01-27 PROCEDURE — 99999 PR STA SHADOW: ICD-10-PCS | Mod: S$PBB,PBBFAC,, | Performed by: PSYCHIATRY & NEUROLOGY

## 2022-01-27 PROCEDURE — 99999 PR STA SHADOW: CPT | Mod: PBBFAC,,, | Performed by: PSYCHIATRY & NEUROLOGY

## 2022-01-27 RX ADMIN — ONABOTULINUMTOXINA 300 UNITS: 100 INJECTION, POWDER, LYOPHILIZED, FOR SOLUTION INTRADERMAL; INTRAMUSCULAR at 01:01
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
## 2022-02-21 PROBLEM — Z51.81 ENCOUNTER FOR THERAPEUTIC DRUG MONITORING: Status: RESOLVED | Noted: 2019-09-10 | Resolved: 2022-02-21

## 2022-02-21 PROBLEM — G57.60: Status: ACTIVE | Noted: 2022-02-21

## 2022-02-21 PROBLEM — Z76.0 ENCOUNTER FOR ISSUE OF REPEAT PRESCRIPTION: Status: RESOLVED | Noted: 2019-09-10 | Resolved: 2022-02-21

## 2022-02-21 PROBLEM — D50.9 IRON DEFICIENCY ANEMIA: Status: ACTIVE | Noted: 2022-02-21

## 2022-02-21 PROBLEM — Z71.3 DIETARY COUNSELING: Status: RESOLVED | Noted: 2019-09-10 | Resolved: 2022-02-21

## 2022-03-06 PROBLEM — E83.110 HEREDITARY HEMOCHROMATOSIS: Status: ACTIVE | Noted: 2022-03-06

## 2022-04-01 PROBLEM — E83.19 IRON OVERLOAD: Status: ACTIVE | Noted: 2022-04-01

## 2022-04-01 PROBLEM — R74.01 TRANSAMINITIS: Status: ACTIVE | Noted: 2022-04-01

## 2022-04-01 PROBLEM — Z71.2 ENCOUNTER TO DISCUSS TEST RESULTS: Status: ACTIVE | Noted: 2022-04-01

## 2022-04-11 ENCOUNTER — HISTORICAL (OUTPATIENT)
Dept: ADMINISTRATIVE | Facility: HOSPITAL | Age: 52
End: 2022-04-11
Payer: MEDICAID

## 2022-04-27 ENCOUNTER — PROCEDURE VISIT (OUTPATIENT)
Dept: NEUROLOGY | Facility: CLINIC | Age: 52
End: 2022-04-27
Payer: MEDICARE

## 2022-04-27 VITALS
SYSTOLIC BLOOD PRESSURE: 128 MMHG | DIASTOLIC BLOOD PRESSURE: 87 MMHG | WEIGHT: 152.13 LBS | BODY MASS INDEX: 23.88 KG/M2 | HEIGHT: 67 IN

## 2022-04-27 DIAGNOSIS — M79.18 MYOFASCIAL PAIN: ICD-10-CM

## 2022-04-27 DIAGNOSIS — M54.2 NECK PAIN: Primary | ICD-10-CM

## 2022-04-27 PROCEDURE — 20553 NJX 1/MLT TRIGGER POINTS 3/>: CPT | Mod: PBBFAC | Performed by: NURSE PRACTITIONER

## 2022-04-27 PROCEDURE — 99999 PR STA SHADOW: CPT | Mod: PBBFAC,,, | Performed by: NURSE PRACTITIONER

## 2022-04-27 PROCEDURE — 99999 PR STA SHADOW: ICD-10-PCS | Mod: PBBFAC,,, | Performed by: NURSE PRACTITIONER

## 2022-04-27 NOTE — PROCEDURES
Procedures Date: 04/27/2022     Cervical TPI    Trigger points were palpated bilaterally along the Bilateral trapezius, Rhomboids, and Levator Scapulae. Sites were prepped with alcohol, and injected with 1 mL 0.5% Marcaine, using a 5 mL syringe with a 30 gauge ½ inch needle.     Patient tolerated procedure well and blood loss was minimal.

## 2022-04-28 ENCOUNTER — PROCEDURE VISIT (OUTPATIENT)
Dept: NEUROLOGY | Facility: CLINIC | Age: 52
End: 2022-04-28
Payer: MEDICARE

## 2022-04-28 DIAGNOSIS — M79.18 MYOFASCIAL PAIN: Primary | ICD-10-CM

## 2022-04-28 DIAGNOSIS — G24.3 CERVICAL DYSTONIA: ICD-10-CM

## 2022-04-28 PROCEDURE — 99999 PR STA SHADOW: CPT | Mod: PBBFAC,,, | Performed by: PSYCHIATRY & NEUROLOGY

## 2022-04-28 PROCEDURE — 64616 CHEMODENERV MUSC NECK DYSTON: CPT | Mod: 50,PBBFAC | Performed by: PSYCHIATRY & NEUROLOGY

## 2022-04-28 PROCEDURE — 99999 PR STA SHADOW: ICD-10-PCS | Mod: PBBFAC,,, | Performed by: PSYCHIATRY & NEUROLOGY

## 2022-04-28 RX ADMIN — ONABOTULINUMTOXINA 300 UNITS: 100 INJECTION, POWDER, LYOPHILIZED, FOR SOLUTION INTRADERMAL; INTRAMUSCULAR at 01:04

## 2022-04-28 NOTE — PROCEDURES
Procedures         Date of Procedure: 4/28/2022    Reason for Procedure: Chronic Migraine and cervical dystonia      This procedure is deemed medically necessary today to prevent worsening of this patient's chronic severe pain and to prevent ER and hospital utilization for pain at this time.     Informed consent was obtained prior to performing this study. Two patient identifiers were confirmed with the patient prior to performing this study. A time out to determine correct patient and and agreement on procedure performed was conducted prior to the injections.   Patient uses  topical ethyl chloride for topical  anaesthetic- she uses this prior to injections with no adverse reactions noted with her topical use. Can continue PRN prior to injections  Procedure Details: After informed consent obtained the patient's head and upper neck was cleansed with alcohol rub and 285 Units of Botox (diluted 1:1) was injected in the following bilateral muscles:   5units in corregator* (over 2 sites), 5 units in Procerus, 20 units Frontalis* (over 4 sites), 20 units in Temporalis* (over 4 sites), 30 units in occipitalis* (over 6 sites), 10 units in each semispinalis capitis, 10 units in each splenius capitus, 10 units in the right longismus and 15 units in the left longismus, 60 units in Trapezius* on the right and 70 units on trapezius on the left(over 8 sites). The remaining 15 units were wasted per her last injection series  Levator muscles were not injected as patient states she had side effects with those injections prior.   The patient tolerated the procedure well with no more than 0.25cc of blood loss. She was observed for several minutes post injection and given a handout from [employer] regarding when and where to seek help if side effects are experienced.     -Note injections are spread to more sights for Trapezius(same amount of Botox) per her request as this helps  -Note her prior known cervical radiculopathy, right median  and ulnar neuropathies which are not clinically active .   - She continues cervical trigger point injections the same week as Botox q 3 months for cervicalgia. Lidoderm topical cream given prior for her neck pain PRN  -Continue Botox for chronic migraine and for cervical dystonia. She uses nearly 300 units as above which does help reduce pain from both conditions for  2.5 months consistently after injection   -She was weaned off of hydrocodone via PCP prior and she  used dry needling as an alternative measure for pain control.   -PCP manages anxiety  -Continue Baclofen- has been stable with 50mg nightly in addition to 2 tablets of  Tizanidine at night instead of daytime dosing. Aware of the risk  the risk of sedation and denies sedation. She uses this for sleep with muscle pain at night and functions well on combination dose   Robaxin used prior  RTC in 3 months for  Botox and for cervical trigger point injections.

## 2022-05-03 PROBLEM — Z71.89 ENCOUNTER TO DISCUSS TREATMENT OPTIONS: Status: ACTIVE | Noted: 2022-05-03

## 2022-05-12 ENCOUNTER — PATIENT MESSAGE (OUTPATIENT)
Dept: NEUROLOGY | Facility: CLINIC | Age: 52
End: 2022-05-12
Payer: MEDICARE

## 2022-05-12 DIAGNOSIS — M54.2 NECK PAIN: ICD-10-CM

## 2022-05-12 RX ORDER — SENNOSIDES 25 MG/1
TABLET, FILM COATED ORAL
Qty: 45 G | Refills: 11 | Status: SHIPPED | OUTPATIENT
Start: 2022-05-12 | End: 2023-02-16 | Stop reason: SDUPTHER

## 2022-05-13 ENCOUNTER — TELEPHONE (OUTPATIENT)
Dept: NEUROLOGY | Facility: CLINIC | Age: 52
End: 2022-05-13
Payer: MEDICARE

## 2022-05-13 NOTE — TELEPHONE ENCOUNTER
PA approved for Lidocaine 5% ointment.        This approval authorizes your coverage from 05/01/2022 - 08/11/2022, unless we notify you   otherwise, and as long as the following conditions apply:   
PA initiated via covermymeds on Lidocaine 5% ointment.       Key: [id number]  
No

## 2022-07-11 ENCOUNTER — PATIENT MESSAGE (OUTPATIENT)
Dept: NEUROLOGY | Facility: CLINIC | Age: 52
End: 2022-07-11
Payer: MEDICARE

## 2022-07-12 ENCOUNTER — PATIENT MESSAGE (OUTPATIENT)
Dept: NEUROLOGY | Facility: CLINIC | Age: 52
End: 2022-07-12
Payer: MEDICARE

## 2022-08-03 ENCOUNTER — PROCEDURE VISIT (OUTPATIENT)
Dept: NEUROLOGY | Facility: CLINIC | Age: 52
End: 2022-08-03
Payer: MEDICARE

## 2022-08-03 DIAGNOSIS — M54.2 NECK PAIN: ICD-10-CM

## 2022-08-03 PROCEDURE — 20553 NJX 1/MLT TRIGGER POINTS 3/>: CPT | Mod: S$GLB,,, | Performed by: NURSE PRACTITIONER

## 2022-08-03 PROCEDURE — 20553 PR INJECT TRIGGER POINTS, > 3: ICD-10-PCS | Mod: S$GLB,,, | Performed by: NURSE PRACTITIONER

## 2022-08-03 NOTE — PROCEDURES
Procedures Date: 08/03/2022     Cervical TPI    Trigger points were palpated bilaterally along the Bilateral trapezius, Rhomboids, and Levator Scapulae. Sites were prepped with alcohol, and injected with 1 mL 0.5% Marcaine, using a 5 mL syringe with a 30 gauge ½ inch needle.     Patient tolerated procedure well and blood loss was minimal.

## 2022-08-04 ENCOUNTER — TELEPHONE (OUTPATIENT)
Dept: NEUROLOGY | Facility: CLINIC | Age: 52
End: 2022-08-04

## 2022-08-04 ENCOUNTER — PROCEDURE VISIT (OUTPATIENT)
Dept: NEUROLOGY | Facility: CLINIC | Age: 52
End: 2022-08-04
Payer: MEDICARE

## 2022-08-04 DIAGNOSIS — G24.3 CERVICAL DYSTONIA: Primary | ICD-10-CM

## 2022-08-04 DIAGNOSIS — M54.2 NECK PAIN: ICD-10-CM

## 2022-08-04 PROCEDURE — 64616 PR CHEMODENERVATION NECK MUSCLES EXC LARNYNX, UNI: ICD-10-PCS | Mod: 50,S$GLB,, | Performed by: PSYCHIATRY & NEUROLOGY

## 2022-08-04 PROCEDURE — 64616 CHEMODENERV MUSC NECK DYSTON: CPT | Mod: 50,S$GLB,, | Performed by: PSYCHIATRY & NEUROLOGY

## 2022-08-04 NOTE — PROCEDURES
Procedures     Date of Procedure:8/4/2022    Reason for Procedure: Chronic Migraine and cervical dystonia      This procedure is deemed medically necessary today to prevent worsening of this patient's chronic severe pain and to prevent ER and hospital utilization for pain at this time.     Informed consent was obtained prior to performing this study. Two patient identifiers were confirmed with the patient prior to performing this study. A time out to determine correct patient and and agreement on procedure performed was conducted prior to the injections.   Patient uses  topical ethyl chloride for topical  anaesthetic- she uses this prior to injections with no adverse reactions noted with her topical use. Can continue PRN prior to injections  Procedure Details: After informed consent obtained the patient's head and upper neck was cleansed with alcohol rub and 285 Units of Botox (diluted 1:1) was injected in the following bilateral muscles:   5units in corregator* (over 2 sites), 5 units in Procerus, 20 units Frontalis* (over 4 sites), 20 units in Temporalis* (over 4 sites), 30 units in occipitalis* (over 6 sites), 10 units in each semispinalis capitis, 10 units in each splenius capitus, 10 units in the right longismus and 15 units in the left longismus, 60 units in Trapezius* on the right and 70 units on trapezius on the left(over 8 sites). The remaining 15 units were wasted per her last injection series  Levator muscles were not injected as patient states she had side effects with those injections prior.   The patient tolerated the procedure well with no more than 0.25cc of blood loss. She was observed for several minutes post injection and given a handout from [employer] regarding when and where to seek help if side effects are experienced.     -States she has had one week of right 1st-3rd finger numbness with acti vies. Suggested a CT brace nightly and notify me if not improved in 3-6 weeks- would need EMG at that  point  -Note injections are spread to more sights for Trapezius(same amount of Botox) per her request as this helps  -Note her prior known cervical radiculopathy, right median and ulnar neuropathies which are not clinically active .   - She continues cervical trigger point injections the same week as Botox q 3 months for cervicalgia. Lidoderm topical cream given prior for her neck pain PRN  -Continue Botox for chronic migraine and for cervical dystonia. She uses nearly 300 units as above which does help reduce pain from both conditions for  2.5 months consistently after injection   -She was weaned off of hydrocodone via PCP prior and she  used dry needling as an alternative measure for pain control.   -PCP manages anxiety  -Continue Baclofen- has been stable with 50mg nightly in addition to 2 tablets of  Tizanidine at night instead of daytime dosing. Aware of the risk  the risk of sedation and denies sedation. She uses this for sleep with muscle pain at night and functions well on combination dose   Robaxin used prior  RTC in 3 months for  Botox and for cervical trigger point injections.

## 2022-08-15 PROBLEM — E83.119 HEMOCHROMATOSIS: Status: RESOLVED | Noted: 2022-04-01 | Resolved: 2022-08-15

## 2022-08-15 PROBLEM — R79.0 RAISED SERUM IRON: Status: ACTIVE | Noted: 2022-08-15

## 2022-08-15 PROBLEM — Z71.2 ENCOUNTER TO DISCUSS TEST RESULTS: Status: RESOLVED | Noted: 2022-04-01 | Resolved: 2022-08-15

## 2022-08-15 PROBLEM — E53.8 B12 DEFICIENCY: Status: ACTIVE | Noted: 2022-08-15

## 2022-08-15 PROBLEM — Z71.89 ENCOUNTER TO DISCUSS TREATMENT OPTIONS: Status: RESOLVED | Noted: 2022-05-03 | Resolved: 2022-08-15

## 2022-08-15 PROBLEM — E83.119 HEMOCHROMATOSIS: Status: ACTIVE | Noted: 2022-04-01

## 2022-09-08 ENCOUNTER — TELEPHONE (OUTPATIENT)
Dept: NEUROLOGY | Facility: CLINIC | Age: 52
End: 2022-09-08
Payer: MEDICARE

## 2022-09-08 NOTE — TELEPHONE ENCOUNTER
KHUSHBU MAYEN (Key: [id number]) - [id number]  Lidocaine 5% ointment       Status: PA Response - Approved    Created: September 7th, 2022 [phone]    Sent: September 8th, 2022

## 2022-11-09 ENCOUNTER — PROCEDURE VISIT (OUTPATIENT)
Dept: NEUROLOGY | Facility: CLINIC | Age: 52
End: 2022-11-09
Payer: MEDICARE

## 2022-11-09 DIAGNOSIS — M54.2 NECK PAIN: ICD-10-CM

## 2022-11-09 PROCEDURE — 20553 NJX 1/MLT TRIGGER POINTS 3/>: CPT | Mod: S$GLB,,, | Performed by: NURSE PRACTITIONER

## 2022-11-09 PROCEDURE — 20553 PR INJECT TRIGGER POINTS, > 3: ICD-10-PCS | Mod: S$GLB,,, | Performed by: NURSE PRACTITIONER

## 2022-11-09 NOTE — PROCEDURES
Procedures  Date: 11/09/2022     Cervical TPI    Trigger points were palpated bilaterally along the Bilateral trapezius, Rhomboids, and Levator Scapulae. Sites were prepped with alcohol, and injected with 1 mL 0.5% Marcaine, using a 5 mL syringe with a 30 gauge ½ inch needle.     Patient tolerated procedure well and blood loss was minimal.

## 2022-11-10 ENCOUNTER — PROCEDURE VISIT (OUTPATIENT)
Dept: NEUROLOGY | Facility: CLINIC | Age: 52
End: 2022-11-10
Payer: MEDICARE

## 2022-11-10 DIAGNOSIS — R20.0 NUMBNESS AND TINGLING IN BOTH HANDS: ICD-10-CM

## 2022-11-10 DIAGNOSIS — G24.3 CERVICAL DYSTONIA: Primary | ICD-10-CM

## 2022-11-10 DIAGNOSIS — R20.2 NUMBNESS AND TINGLING IN BOTH HANDS: ICD-10-CM

## 2022-11-10 PROCEDURE — 64616 CHEMODENERV MUSC NECK DYSTON: CPT | Mod: 50,S$GLB,, | Performed by: PSYCHIATRY & NEUROLOGY

## 2022-11-10 PROCEDURE — 64616 PR CHEMODENERVATION NECK MUSCLES EXC LARNYNX, UNI: ICD-10-PCS | Mod: 50,S$GLB,, | Performed by: PSYCHIATRY & NEUROLOGY

## 2022-11-10 NOTE — PROCEDURES
Procedures  Procedures      Date of Procedure: 11/10/2022    Reason for Procedure: Chronic Migraine and cervical dystonia      This procedure is deemed medically necessary today to prevent worsening of this patient's chronic severe pain and to prevent ER and hospital utilization for pain at this time.     Informed consent was obtained prior to performing this study. Two patient identifiers were confirmed with the patient prior to performing this study. A time out to determine correct patient and and agreement on procedure performed was conducted prior to the injections.   Patient uses  topical ethyl chloride for topical  anaesthetic- she uses this prior to injections with no adverse reactions noted with her topical use. Can continue PRN prior to injections  Procedure Details: After informed consent obtained the patient's head and upper neck was cleansed with alcohol rub and 285 Units of Botox (diluted 1:1) was injected in the following bilateral muscles:   5units in corregator* (over 2 sites), 5 units in Procerus, 20 units Frontalis* (over 4 sites), 20 units in Temporalis* (over 4 sites), 30 units in occipitalis* (over 6 sites), 10 units in each semispinalis capitis, 10 units in each splenius capitus, 10 units in the right longismus and 15 units in the left longismus, 60 units in Trapezius* on the right and 70 units on trapezius on the left(over 8 sites). The remaining 15 units were wasted per her last injection series  Levator muscles were not injected as patient states she had side effects with those injections prior.   The patient tolerated the procedure well with no more than 0.25cc of blood loss. She was observed for several minutes post injection and given a handout from [employer] regarding when and where to seek help if side effects are experienced.     -States she has right 1st-3rd finger numbness with activies and now the same on the left. Brace not helpful. May need to update EMG to better look at her  entrapment neuropathies. She will try OT first instead per orders.  Notify me if worse.   -Note injections are spread to more sights for Trapezius(same amount of Botox) per her request as this helps  -Note her prior known cervical radiculopathy, right median and ulnar neuropathies which are not clinically active .   - She continues cervical trigger point injections the same week as Botox q 3 months for cervicalgia. Lidoderm topical cream given prior for her neck pain PRN  -Continue Botox for chronic migraine and for cervical dystonia. She uses nearly 300 units as above which does help reduce pain from both conditions for  2.5 months consistently after injection   -She was weaned off of hydrocodone via PCP prior and she  used dry needling as an alternative measure for pain control.   -PCP manages anxiety  -Continue Baclofen- has been stable with 50mg nightly in addition to 2 tablets of  Tizanidine at night instead of daytime dosing. Aware of the risk  the risk of sedation and denies sedation. She uses this for sleep with muscle pain at night and functions well on combination dose   Robaxin used prior  RTC in 3 months for  Botox and for cervical trigger point injections.

## 2022-11-14 ENCOUNTER — PATIENT MESSAGE (OUTPATIENT)
Dept: NEUROLOGY | Facility: CLINIC | Age: 52
End: 2022-11-14
Payer: MEDICARE

## 2022-12-15 ENCOUNTER — TELEPHONE (OUTPATIENT)
Dept: NEUROLOGY | Facility: CLINIC | Age: 52
End: 2022-12-15
Payer: MEDICARE

## 2022-12-15 NOTE — TELEPHONE ENCOUNTER
KHUSHBU MAYEN (Key: [id number]) - [id number]  Lidocaine 5% ointment  Status: PA Response - Approved  Created: December 14th, 2022 [phone]  Sent: December 15th, 2022

## 2023-01-26 ENCOUNTER — TELEPHONE (OUTPATIENT)
Dept: NEUROLOGY | Facility: CLINIC | Age: 53
End: 2023-01-26
Payer: MEDICARE

## 2023-01-26 DIAGNOSIS — M54.2 NECK PAIN: Primary | ICD-10-CM

## 2023-02-14 ENCOUNTER — PROCEDURE VISIT (OUTPATIENT)
Dept: NEUROLOGY | Facility: CLINIC | Age: 53
End: 2023-02-14
Payer: MEDICARE

## 2023-02-14 DIAGNOSIS — M54.2 NECK PAIN: ICD-10-CM

## 2023-02-14 PROCEDURE — 20553 PR INJECT TRIGGER POINTS, > 3: ICD-10-PCS | Mod: S$GLB,,, | Performed by: NURSE PRACTITIONER

## 2023-02-14 PROCEDURE — 20553 NJX 1/MLT TRIGGER POINTS 3/>: CPT | Mod: S$GLB,,, | Performed by: NURSE PRACTITIONER

## 2023-02-14 NOTE — PROCEDURES
Procedures  Date: 02/14/2023     Cervical TPI    Trigger points were palpated bilaterally along the Bilateral trapezius, Rhomboids, and Levator Scapulae. Sites were prepped with alcohol, and injected with 1 mL 0.5% Marcaine, using a 5 mL syringe with a 30 gauge ½ inch needle.     Patient tolerated procedure well and blood loss was minimal.     Palpable trigger points noted to C-spine bilaterally. Trigger point injections are medically necessary to treat this patient's neck pain today.

## 2023-02-16 ENCOUNTER — PROCEDURE VISIT (OUTPATIENT)
Dept: NEUROLOGY | Facility: CLINIC | Age: 53
End: 2023-02-16
Payer: MEDICARE

## 2023-02-16 DIAGNOSIS — G24.3 CERVICAL DYSTONIA: Primary | ICD-10-CM

## 2023-02-16 PROCEDURE — 64616 CHEMODENERV MUSC NECK DYSTON: CPT | Mod: 50,S$GLB,, | Performed by: PSYCHIATRY & NEUROLOGY

## 2023-02-16 PROCEDURE — 64616 PR CHEMODENERVATION NECK MUSCLES EXC LARNYNX, UNI: ICD-10-PCS | Mod: 50,S$GLB,, | Performed by: PSYCHIATRY & NEUROLOGY

## 2023-02-16 NOTE — PROCEDURES
Procedures      Date of Procedure: 2/16/2023    Reason for Procedure: Chronic Migraine and cervical dystonia      This procedure is deemed medically necessary today to prevent worsening of this patient's chronic severe pain and to prevent ER and hospital utilization for pain at this time.     Informed consent was obtained prior to performing this study. Two patient identifiers were confirmed with the patient prior to performing this study. A time out to determine correct patient and and agreement on procedure performed was conducted prior to the injections.   Topical Geuber's Spray (spray and stretch) was used or patient comfort at the injection sites.   Procedure Details: After informed consent obtained the patient's head and upper neck was cleansed with alcohol rub and 285 Units of Botox (diluted 1:1) was injected in the following bilateral muscles:   5units in corregator* (over 2 sites), 5 units in Procerus, 20 units Frontalis* (over 4 sites), 20 units in Temporalis* (over 4 sites), 30 units in occipitalis* (over 6 sites), 10 units in each semispinalis capitis, 10 units in each splenius capitus, 10 units in the right longismus and 15 units in the left longismus, 60 units in Trapezius* on the right and 70 units on trapezius on the left(over 8 sites). The remaining 15 units were wasted per her last injection series  Levator muscles were not injected as patient states she had side effects with those injections prior.   The patient tolerated the procedure well with no more than 0.25cc of blood loss. She was observed for several minutes post injection and given a handout from [employer] regarding when and where to seek help if side effects are experienced.     -States she has right 1st-3rd finger numbness with activies and now the same on the left. Brace not helpful. May need to update EMG to better look at her entrapment neuropathies. She will try OT first - pending currently.  Notify me if worse.   -Note injections  are spread to more sights for Trapezius(same amount of Botox) per her request as this helps  -Note her prior known cervical radiculopathy, right median and ulnar neuropathies which are not clinically active .   - She continues cervical trigger point injections the same week as Botox q 3 months for cervicalgia. Lidoderm topical cream given prior for her neck pain PRN  -Continue Botox for chronic migraine and for cervical dystonia. She uses nearly 300 units as above which does help reduce pain from both conditions for  2.5 months consistently after injection   -She was weaned off of hydrocodone via PCP prior and she  used dry needling as an alternative measure for pain control.   -PCP manages anxiety  -Continue Baclofen- has been stable with 50mg nightly in addition to 2 tablets of  Tizanidine at night instead of daytime dosing. Aware of the risk  the risk of sedation and denies sedation. She uses this for sleep with muscle pain at night and functions well on combination dose   Robaxin used prior  RTC in 3 months for  Botox and for cervical trigger point injections.

## 2023-02-27 PROBLEM — Z71.2 ENCOUNTER TO DISCUSS TEST RESULTS: Status: RESOLVED | Noted: 2022-04-01 | Resolved: 2023-02-27

## 2023-02-27 PROBLEM — Z71.89 ENCOUNTER TO DISCUSS TREATMENT OPTIONS: Status: RESOLVED | Noted: 2022-05-03 | Resolved: 2023-02-27

## 2023-03-15 ENCOUNTER — PATIENT MESSAGE (OUTPATIENT)
Dept: NEUROLOGY | Facility: CLINIC | Age: 53
End: 2023-03-15
Payer: MEDICARE

## 2023-03-15 DIAGNOSIS — G24.3 CERVICAL DYSTONIA: Primary | ICD-10-CM

## 2023-03-15 DIAGNOSIS — M54.41 BILATERAL LOW BACK PAIN WITH BILATERAL SCIATICA, UNSPECIFIED CHRONICITY: ICD-10-CM

## 2023-03-15 DIAGNOSIS — M54.2 NECK PAIN: ICD-10-CM

## 2023-03-15 DIAGNOSIS — M79.18 MYOFASCIAL PAIN: ICD-10-CM

## 2023-03-15 DIAGNOSIS — R20.0 BILATERAL HAND NUMBNESS: ICD-10-CM

## 2023-03-15 DIAGNOSIS — M54.42 BILATERAL LOW BACK PAIN WITH BILATERAL SCIATICA, UNSPECIFIED CHRONICITY: ICD-10-CM

## 2023-03-16 ENCOUNTER — PATIENT MESSAGE (OUTPATIENT)
Dept: NEUROLOGY | Facility: CLINIC | Age: 53
End: 2023-03-16
Payer: MEDICARE

## 2023-03-19 PROBLEM — R93.1 AGATSTON CORONARY ARTERY CALCIUM SCORE BETWEEN 200 AND 399: Status: ACTIVE | Noted: 2023-03-19

## 2023-03-20 PROBLEM — M72.2 BILATERAL PLANTAR FASCIITIS: Status: ACTIVE | Noted: 2023-03-20

## 2023-03-20 PROBLEM — U07.1 COVID-19: Status: ACTIVE | Noted: 2023-03-20

## 2023-04-10 ENCOUNTER — PATIENT MESSAGE (OUTPATIENT)
Dept: NEUROLOGY | Facility: CLINIC | Age: 53
End: 2023-04-10
Payer: MEDICARE

## 2023-04-10 NOTE — TELEPHONE ENCOUNTER
PER HER LAST CHART NOTE FROM 2/16/23 :    -Continue Baclofen- has been stable with 50mg nightly in addition to 2 tablets of  Tizanidine at night instead of daytime dosing. Aware of the risk  the risk of sedation and denies sedation. She uses this for sleep with muscle pain at night and functions well on combination dose   Robaxin used prior:

## 2023-04-17 ENCOUNTER — TELEPHONE (OUTPATIENT)
Dept: NEUROLOGY | Facility: CLINIC | Age: 53
End: 2023-04-17
Payer: MEDICARE

## 2023-04-17 RX ORDER — TIZANIDINE 4 MG/1
4 TABLET ORAL EVERY 8 HOURS
Qty: 270 TABLET | Refills: 3 | Status: SHIPPED | OUTPATIENT
Start: 2023-04-17 | End: 2023-12-21 | Stop reason: SDUPTHER

## 2023-04-17 NOTE — TELEPHONE ENCOUNTER
Key: [id number] - PA Case ID: [id number] - Rx #: [id number] help? Call us at [phone]  Status  Sent to [facility]  Lidocaine 5% ointment

## 2023-05-17 ENCOUNTER — PROCEDURE VISIT (OUTPATIENT)
Dept: NEUROLOGY | Facility: CLINIC | Age: 53
End: 2023-05-17
Payer: MEDICARE

## 2023-05-17 DIAGNOSIS — M54.2 NECK PAIN: Primary | ICD-10-CM

## 2023-05-17 PROCEDURE — 20553 NJX 1/MLT TRIGGER POINTS 3/>: CPT | Mod: S$GLB,,, | Performed by: NURSE PRACTITIONER

## 2023-05-17 PROCEDURE — 20553 PR INJECT TRIGGER POINTS, > 3: ICD-10-PCS | Mod: S$GLB,,, | Performed by: NURSE PRACTITIONER

## 2023-05-17 NOTE — PROCEDURES
Procedures  Date: 05/17/2023     Cervical TPI    Trigger points were palpated bilaterally along the Bilateral trapezius, Rhomboids, and Levator Scapulae. Sites were prepped with alcohol, and injected with 1 mL 0.5% Marcaine, using a 5 mL syringe with a 30 gauge ½ inch needle.     Patient tolerated procedure well and blood loss was minimal.     Palpable trigger points noted to C-spine bilaterally. Trigger point injections are medically necessary to treat this patient's neck pain today.

## 2023-05-18 ENCOUNTER — PROCEDURE VISIT (OUTPATIENT)
Dept: NEUROLOGY | Facility: CLINIC | Age: 53
End: 2023-05-18
Payer: MEDICARE

## 2023-05-18 DIAGNOSIS — G24.3 CERVICAL DYSTONIA: ICD-10-CM

## 2023-05-18 DIAGNOSIS — M54.2 NECK PAIN: Primary | ICD-10-CM

## 2023-05-18 PROCEDURE — 64616 CHEMODENERV MUSC NECK DYSTON: CPT | Mod: 50,S$GLB,, | Performed by: PSYCHIATRY & NEUROLOGY

## 2023-05-18 PROCEDURE — 64616 PR CHEMODENERVATION NECK MUSCLES EXC LARNYNX, UNI: ICD-10-PCS | Mod: 50,S$GLB,, | Performed by: PSYCHIATRY & NEUROLOGY

## 2023-05-18 NOTE — PROCEDURES
Procedures      Procedures        Date of Procedure: 5/18/2023    Reason for Procedure: Chronic Migraine and cervical dystonia      This procedure is deemed medically necessary today to prevent worsening of this patient's chronic severe pain and to prevent ER and hospital utilization for pain at this time.     Informed consent was obtained prior to performing this study. Two patient identifiers were confirmed with the patient prior to performing this study. A time out to determine correct patient and and agreement on procedure performed was conducted prior to the injections.   Topical Geuber's Spray (spray and stretch) was used or patient comfort at the injection sites.   Procedure Details: After informed consent obtained the patient's head and upper neck was cleansed with alcohol rub and 285 Units of Botox (diluted 1:1) was injected in the following bilateral muscles:   5units in corregator* (over 2 sites), 5 units in Procerus, 20 units Frontalis* (over 4 sites), 20 units in Temporalis* (over 4 sites), 30 units in occipitalis* (over 6 sites), 10 units in each semispinalis capitis, 10 units in each splenius capitus, 10 units in the right longismus and 15 units in the left longismus, 60 units in Trapezius* on the right and 70 units on trapezius on the left(over 8 sites). The remaining 15 units were wasted per her last injection series  Levator muscles were not injected as patient states she had side effects with those injections prior.   The patient tolerated the procedure well with no more than 0.25cc of blood loss. She was observed for several minutes post injection and given a handout from [employer] regarding when and where to seek help if side effects are experienced.     -Note injections are spread to more sights for Trapezius(same amount of Botox) per her request as this helps  -Note her prior known cervical radiculopathy, right median and ulnar neuropathies. She has relief with parafin wax per OT with hand  numbness at this time.   - She continues cervical trigger point injections the same week as Botox q 3 months for cervicalgia. Lidoderm topical cream given prior for her neck pain PRN  -Continue Botox for chronic migraine and for cervical dystonia. She uses nearly 300 units as above which does help reduce pain from both conditions for consistently after injection   -She was weaned off of hydrocodone via PCP prior and she  used dry needling as an alternative measure for pain control.   -PCP manages anxiety  -Continue Baclofen- has been stable with 50mg nightly in addition to Tizanidine at night instead of daytime dosing. Aware of the risk  the risk of sedation and denies sedation. She uses this for sleep with muscle pain at night and functions well on combination dose   Robaxin used prior  RTC in 3 months for  Botox and for cervical trigger point injections.

## 2023-06-22 PROBLEM — Z71.9 ENCOUNTER FOR EDUCATION: Status: ACTIVE | Noted: 2019-09-10

## 2023-07-02 DIAGNOSIS — G24.9 DYSTONIA: ICD-10-CM

## 2023-07-03 RX ORDER — BACLOFEN 10 MG/1
TABLET ORAL
Qty: 540 TABLET | Refills: 1 | Status: SHIPPED | OUTPATIENT
Start: 2023-07-03 | End: 2023-12-21

## 2023-08-24 ENCOUNTER — PROCEDURE VISIT (OUTPATIENT)
Dept: NEUROLOGY | Facility: CLINIC | Age: 53
End: 2023-08-24
Payer: MEDICARE

## 2023-08-24 DIAGNOSIS — G24.3 CERVICAL DYSTONIA: Primary | ICD-10-CM

## 2023-08-24 DIAGNOSIS — G24.3 CERVICAL DYSTONIA: ICD-10-CM

## 2023-08-24 DIAGNOSIS — M54.2 NECK PAIN: Primary | ICD-10-CM

## 2023-08-24 PROCEDURE — 20553 NJX 1/MLT TRIGGER POINTS 3/>: CPT | Mod: S$GLB,,, | Performed by: NURSE PRACTITIONER

## 2023-08-24 PROCEDURE — 20553 PR INJECT TRIGGER POINTS, > 3: ICD-10-PCS | Mod: S$GLB,,, | Performed by: NURSE PRACTITIONER

## 2023-08-24 PROCEDURE — 64616 PR CHEMODENERVATION NECK MUSCLES EXC LARNYNX, UNI: ICD-10-PCS | Mod: 59,50,S$GLB, | Performed by: PSYCHIATRY & NEUROLOGY

## 2023-08-24 PROCEDURE — 64616 CHEMODENERV MUSC NECK DYSTON: CPT | Mod: 59,50,S$GLB, | Performed by: PSYCHIATRY & NEUROLOGY

## 2023-08-24 NOTE — PROCEDURES
Procedures    Date of Procedure: 7/25/2023    Reason for Procedure: Chronic Migraine and cervical dystonia      This procedure is deemed medically necessary today to prevent worsening of this patient's chronic severe pain and to prevent ER and hospital utilization for pain at this time.     Informed consent was obtained prior to performing this study. Two patient identifiers were confirmed with the patient prior to performing this study. A time out to determine correct patient and and agreement on procedure performed was conducted prior to the injections.   Topical Geuber's Spray (spray and stretch) was used or patient comfort at the injection sites.   Procedure Details: After informed consent obtained the patient's head and upper neck was cleansed with alcohol rub and 285 Units of Botox (diluted 1:1) was injected in the following bilateral muscles:   5units in corregator* (over 2 sites), 5 units in Procerus, 20 units Frontalis* (over 4 sites), 20 units in Temporalis* (over 4 sites), 30 units in occipitalis* (over 6 sites), 10 units in each semispinalis capitis, 10 units in each splenius capitus, 10 units in the right longismus and 15 units in the left longismus, 60 units in Trapezius* on the right and 70 units on trapezius on the left(over 8 sites). The remaining 15 units were wasted per her last injection series  Levator muscles were not injected as patient states she had side effects with those injections prior.   The patient tolerated the procedure well with no more than 0.25cc of blood loss. She was observed for several minutes post injection and given a handout from [employer] regarding when and where to seek help if side effects are experienced.     -Note injections are spread to more sights for Trapezius(same amount of Botox) per her request as this helps  -Note her prior known cervical radiculopathy, right median and ulnar neuropathies. She had relief with parafin wax per OT with hand numbness  - She  continues cervical trigger point injections the same week as Botox q 3 months for cervicalgia. Lidoderm topical cream given prior for her neck pain PRN  -Continue Botox for chronic migraine and for cervical dystonia. She uses nearly 300 units as above which does help reduce pain from both conditions for consistently after injection   -She weaned off of hydrocodone via PCP prior and she  used dry needling as an alternative measure for pain control prior.   -PCP manages anxiety  -Continue Baclofen- has been stable with 50mg nightly in addition to Tizanidine at night instead of daytime dosing. Aware of the risk  the risk of sedation and denies sedation. She uses this for sleep with muscle pain at night and functions well on combination dose   Robaxin used prior  RTC in 3 months for  Botox and for cervical trigger point injections.

## 2023-08-24 NOTE — PROCEDURES
Procedures  Date: 08/24/2023     Cervical TPI    Trigger points were palpated bilaterally along the Bilateral trapezius, Rhomboids, and Levator Scapulae. Sites were prepped with alcohol, and injected with 1 mL 0.5% Marcaine, using a 5 mL syringe with a 30 gauge ½ inch needle.     Patient tolerated procedure well and blood loss was minimal.     Palpable trigger points noted to C-spine bilaterally. Trigger point injections are medically necessary to treat this patient's neck pain today.

## 2023-08-29 PROBLEM — E78.00 PURE HYPERCHOLESTEROLEMIA: Status: ACTIVE | Noted: 2023-08-29

## 2023-08-29 PROBLEM — R79.0 RAISED SERUM IRON: Status: RESOLVED | Noted: 2022-08-15 | Resolved: 2023-08-29

## 2023-08-29 PROBLEM — U07.1 COVID-19: Status: RESOLVED | Noted: 2023-03-20 | Resolved: 2023-08-29

## 2023-08-29 PROBLEM — I25.10 MILD CAD: Status: ACTIVE | Noted: 2023-08-29

## 2023-11-29 ENCOUNTER — PROCEDURE VISIT (OUTPATIENT)
Dept: NEUROLOGY | Facility: CLINIC | Age: 53
End: 2023-11-29
Payer: MEDICARE

## 2023-11-29 DIAGNOSIS — M54.2 NECK PAIN: Primary | ICD-10-CM

## 2023-11-29 PROCEDURE — 20553 NJX 1/MLT TRIGGER POINTS 3/>: CPT | Mod: S$GLB,,, | Performed by: NURSE PRACTITIONER

## 2023-11-29 PROCEDURE — 20553 PR INJECT TRIGGER POINTS, > 3: ICD-10-PCS | Mod: S$GLB,,, | Performed by: NURSE PRACTITIONER

## 2023-11-29 NOTE — PROCEDURES
Procedures  Date: 11/29/2023     Cervical TPI    Trigger points were palpated bilaterally along the Bilateral trapezius, Rhomboids, and Levator Scapulae. Sites were prepped with alcohol, and injected with 1 mL 0.5% Marcaine, using a 5 mL syringe with a 30 gauge ½ inch needle.     Patient tolerated procedure well and blood loss was minimal.     Palpable trigger points noted to C-spine bilaterally. Trigger point injections are medically necessary to treat this patient's neck pain today.

## 2023-11-30 ENCOUNTER — PROCEDURE VISIT (OUTPATIENT)
Dept: NEUROLOGY | Facility: CLINIC | Age: 53
End: 2023-11-30
Payer: MEDICARE

## 2023-11-30 DIAGNOSIS — G24.3 CERVICAL DYSTONIA: Primary | ICD-10-CM

## 2023-11-30 PROCEDURE — 64616 PR CHEMODENERVATION NECK MUSCLES EXC LARNYNX, UNI: ICD-10-PCS | Mod: 50,S$GLB,, | Performed by: PSYCHIATRY & NEUROLOGY

## 2023-11-30 PROCEDURE — 64616 CHEMODENERV MUSC NECK DYSTON: CPT | Mod: 50,S$GLB,, | Performed by: PSYCHIATRY & NEUROLOGY

## 2023-11-30 NOTE — PROCEDURES
Date of Procedure: 11/30/2023    Reason for Procedure: Chronic Migraine and cervical dystonia      This procedure is deemed medically necessary today to prevent worsening of this patient's chronic severe pain and to prevent ER and hospital utilization for pain at this time.     Informed consent was obtained prior to performing this study. Two patient identifiers were confirmed with the patient prior to performing this study. A time out to determine correct patient and and agreement on procedure performed was conducted prior to the injections.   Topical Geuber's Spray (spray and stretch) was used or patient comfort at the injection sites.   Procedure Details: After informed consent obtained the patient's head and upper neck was cleansed with alcohol rub and 285 Units of Botox (diluted 1:1) was injected in the following bilateral muscles:   5units in corregator* (over 2 sites), 5 units in Procerus, 20 units Frontalis* (over 4 sites), 20 units in Temporalis* (over 4 sites), 30 units in occipitalis* (over 6 sites), 10 units in each semispinalis capitis, 10 units in each splenius capitus, 10 units in the right longismus and 15 units in the left longismus, 60 units in Trapezius* on the right and 70 units on trapezius on the left(over 8 sites). The remaining 15 units were wasted per her last injection series  Levator muscles were not injected as patient stated she had side effects with those injections prior.   The patient tolerated the procedure well with no more than 0.25cc of blood loss. She was observed for several minutes post injection and given a handout from [employer] regarding when and where to seek help if side effects are experienced.     -Note injections are spread to more sights for Trapezius(same amount of Botox) per her request as this helps  -Note her prior known cervical radiculopathy, right median and ulnar neuropathies. She had relief with parafin wax per OT with hand numbness  - She continues cervical  trigger point injections the same week as Botox q 3 months for cervicalgia. Lidoderm topical cream given prior for her neck pain PRN  -Continue Botox for chronic migraine and for cervical dystonia. She uses nearly 300 units as above which does help reduce pain from both conditions for consistently after injection   -She weaned off of hydrocodone via PCP prior and used dry needling as an alternative measure for pain control prior.   -PCP manages anxiety  -Continue Baclofen- has been stable with 50mg nightly in addition to Tizanidine at night instead of daytime dosing. Aware of the risk  the risk of sedation and denies sedation. She uses this for sleep with muscle pain at night and functions well on combination dose   Robaxin used prior  RTC in 3 months for  Botox and for cervical trigger point injections.

## 2023-12-20 DIAGNOSIS — G24.9 DYSTONIA: ICD-10-CM

## 2023-12-21 RX ORDER — BACLOFEN 10 MG/1
TABLET ORAL
Qty: 540 TABLET | Refills: 1 | Status: SHIPPED | OUTPATIENT
Start: 2023-12-21 | End: 2024-03-21

## 2023-12-21 RX ORDER — TIZANIDINE 4 MG/1
4 TABLET ORAL EVERY 8 HOURS
Qty: 270 TABLET | Refills: 3 | Status: SHIPPED | OUTPATIENT
Start: 2023-12-21

## 2023-12-22 ENCOUNTER — TELEPHONE (OUTPATIENT)
Dept: NEUROLOGY | Facility: CLINIC | Age: 53
End: 2023-12-22
Payer: MEDICARE

## 2024-02-23 ENCOUNTER — PATIENT MESSAGE (OUTPATIENT)
Dept: NEUROLOGY | Facility: CLINIC | Age: 54
End: 2024-02-23
Payer: MEDICARE

## 2024-02-23 DIAGNOSIS — M54.2 NECK PAIN: ICD-10-CM

## 2024-02-27 RX ORDER — SENNOSIDES 25 MG/1
TABLET, FILM COATED ORAL
Qty: 45 G | Refills: 11 | Status: SHIPPED | OUTPATIENT
Start: 2024-02-27 | End: 2024-04-12 | Stop reason: SDUPTHER

## 2024-02-28 PROBLEM — U07.1 COVID-19 VIRUS INFECTION: Status: ACTIVE | Noted: 2024-02-28

## 2024-03-01 ENCOUNTER — PATIENT MESSAGE (OUTPATIENT)
Dept: NEUROLOGY | Facility: CLINIC | Age: 54
End: 2024-03-01
Payer: MEDICARE

## 2024-03-13 PROBLEM — F43.0 ACUTE STRESS REACTION CAUSING MIXED DISTURBANCE OF EMOTION AND CONDUCT: Status: RESOLVED | Noted: 2019-10-15 | Resolved: 2024-03-13

## 2024-03-13 PROBLEM — D50.9 IRON DEFICIENCY ANEMIA: Status: RESOLVED | Noted: 2022-02-21 | Resolved: 2024-03-13

## 2024-03-20 DIAGNOSIS — G24.9 DYSTONIA: ICD-10-CM

## 2024-03-21 RX ORDER — BACLOFEN 10 MG/1
TABLET ORAL
Qty: 540 TABLET | Refills: 1 | Status: SHIPPED | OUTPATIENT
Start: 2024-03-21

## 2024-04-10 ENCOUNTER — PROCEDURE VISIT (OUTPATIENT)
Dept: NEUROLOGY | Facility: CLINIC | Age: 54
End: 2024-04-10
Payer: MEDICARE

## 2024-04-10 DIAGNOSIS — M54.2 NECK PAIN: Primary | ICD-10-CM

## 2024-04-10 PROCEDURE — 20553 NJX 1/MLT TRIGGER POINTS 3/>: CPT | Mod: S$GLB,,, | Performed by: NURSE PRACTITIONER

## 2024-04-10 NOTE — PROCEDURES
Procedures  Date: 04/10/2024     Cervical TPI    Trigger points were palpated bilaterally along the Bilateral trapezius, Rhomboids, and Levator Scapulae. Sites were prepped with alcohol, and injected with 1 mL 0.5% Marcaine, using a 5 mL syringe with a 30 gauge ½ inch needle.     Patient tolerated procedure well and blood loss was minimal.     Palpable trigger points noted to C-spine bilaterally. Trigger point injections are medically necessary to treat this patient's neck pain today.

## 2024-04-11 ENCOUNTER — PROCEDURE VISIT (OUTPATIENT)
Dept: NEUROLOGY | Facility: CLINIC | Age: 54
End: 2024-04-11
Payer: MEDICARE

## 2024-04-11 DIAGNOSIS — G24.3 CERVICAL DYSTONIA: Primary | ICD-10-CM

## 2024-04-11 PROCEDURE — 64616 CHEMODENERV MUSC NECK DYSTON: CPT | Mod: 50,S$GLB,, | Performed by: PSYCHIATRY & NEUROLOGY

## 2024-04-11 NOTE — PROCEDURES
Date of Procedure: 4/11/2024    Reason for Procedure: Chronic Migraine and cervical dystonia      This procedure is deemed medically necessary today to prevent worsening of this patient's chronic severe pain and to prevent ER and hospital utilization for pain at this time.     Informed consent was obtained prior to performing this study. Two patient identifiers were confirmed with the patient prior to performing this study. A time out to determine correct patient and and agreement on procedure performed was conducted prior to the injections.   Topical Geuber's Spray (spray and stretch) was used or patient comfort at the injection sites.   Procedure Details: After informed consent obtained the patient's head and upper neck was cleansed with alcohol rub and 285 Units of Botox (diluted 1:1) was injected in the following bilateral muscles:   5units in corregator* (over 2 sites), 5 units in Procerus, 20 units Frontalis* (over 4 sites), 20 units in Temporalis* (over 4 sites), 30 units in occipitalis* (over 6 sites), 10 units in each semispinalis capitis, 10 units in each splenius capitus, 10 units in the right longismus and 15 units in the left longismus, 60 units in Trapezius* on the right and 70 units on trapezius on the left(over 8 sites). The remaining 15 units were wasted per her last injection series  Levator muscles were not injected as patient stated she had side effects with those injections prior.   The patient tolerated the procedure well with no more than 0.25cc of blood loss. She was observed for several minutes post injection and given a handout from [employer] regarding when and where to seek help if side effects are experienced.     -Note injections are spread to more sights for Trapezius(same amount of Botox) per her request as this helps  -Note her prior known cervical radiculopathy, right median and ulnar neuropathies. She had relief with parafin wax per OT with hand numbness  - She continues cervical  trigger point injections the same week as Botox q 3 months for cervicalgia. Lidoderm topical cream given prior for her neck pain PRN  -Continue Botox for chronic migraine and for cervical dystonia. She uses nearly 300 units as above which does help reduce pain from both conditions for consistently after injection   -She weaned off of hydrocodone via PCP prior and used dry needling as an alternative measure for pain control prior.   -PCP manages anxiety  -Continue Baclofen- has been stable with 50mg nightly in addition to Tizanidine at night instead of daytime dosing. Aware of the risk  the risk of sedation and denies sedation. She uses this for sleep with muscle pain at night and functions well on combination dose   Robaxin used prior  RTC in 3 months or more  for  Botox and for cervical trigger point injections.   Currently able to space injection to q 4 months with good relief

## 2024-04-12 DIAGNOSIS — M54.2 NECK PAIN: ICD-10-CM

## 2024-04-16 RX ORDER — SENNOSIDES 25 MG/1
TABLET, FILM COATED ORAL
Qty: 45 G | Refills: 11 | Status: SHIPPED | OUTPATIENT
Start: 2024-04-16 | End: 2024-06-18 | Stop reason: SDUPTHER

## 2024-04-23 ENCOUNTER — PATIENT MESSAGE (OUTPATIENT)
Dept: NEUROLOGY | Facility: CLINIC | Age: 54
End: 2024-04-23
Payer: MEDICARE

## 2024-04-24 PROBLEM — E88.810 METABOLIC SYNDROME: Status: ACTIVE | Noted: 2024-04-24

## 2024-04-24 PROBLEM — U07.1 COVID-19 VIRUS INFECTION: Status: RESOLVED | Noted: 2024-02-28 | Resolved: 2024-04-24

## 2024-04-24 PROBLEM — E16.2 HYPOGLYCEMIA: Status: ACTIVE | Noted: 2024-04-24

## 2024-06-18 ENCOUNTER — PATIENT MESSAGE (OUTPATIENT)
Dept: NEUROLOGY | Facility: CLINIC | Age: 54
End: 2024-06-18
Payer: MEDICARE

## 2024-06-18 DIAGNOSIS — M54.2 NECK PAIN: ICD-10-CM

## 2024-06-18 RX ORDER — SENNOSIDES 25 MG/1
TABLET, FILM COATED ORAL
Qty: 60 G | Refills: 11 | Status: SHIPPED | OUTPATIENT
Start: 2024-06-18

## 2024-07-01 ENCOUNTER — TELEPHONE (OUTPATIENT)
Dept: NEUROLOGY | Facility: CLINIC | Age: 54
End: 2024-07-01
Payer: MEDICARE

## 2024-07-01 NOTE — TELEPHONE ENCOUNTER
Close reason: Other  Payer: [employer] Patient's Payer Case ID: [id number]    [phone]  Note from payer: The medication you have requested is not covered by Medicare Part D Law. If you believe the medication is being used for a medically accepted or compendia supported indication approved by CMS, please contact your patient's plan.    Spoke with Yanni at [facility] and made her aware of this.

## 2024-07-01 NOTE — TELEPHONE ENCOUNTER
----- Message from June Resendiz sent at 2024  2:22 PM CDT -----  Contact: [facility]  Daly Ramos  MRN: [id number]  : 1970  PCP: Jessica Sewell  Home Phone      [phone]  Work Phone      Not on file.  Mobile          [phone]      MESSAGE: Yanni states that she has sent several prior authorization request for the Lidocaine patch with no response.  She is needing to know if a prior authorization has been started for this medication.          Phone: [phone]

## 2024-07-23 ENCOUNTER — PATIENT MESSAGE (OUTPATIENT)
Dept: NEUROLOGY | Facility: CLINIC | Age: 54
End: 2024-07-23
Payer: MEDICARE

## 2024-08-14 ENCOUNTER — PROCEDURE VISIT (OUTPATIENT)
Dept: NEUROLOGY | Facility: CLINIC | Age: 54
End: 2024-08-14
Payer: MEDICARE

## 2024-08-14 DIAGNOSIS — M54.2 NECK PAIN: Primary | ICD-10-CM

## 2024-08-14 NOTE — PROCEDURES
Procedures  Date: 08/14/2024     Cervical TPI    Trigger points were palpated bilaterally along the Bilateral trapezius, Rhomboids, and Levator Scapulae. Sites were prepped with alcohol, and injected with 1 mL 0.5% Marcaine, using a 5 mL syringe with a 30 gauge ½ inch needle.     Patient tolerated procedure well and blood loss was minimal.     Palpable trigger points noted to C-spine bilaterally. Trigger point injections are medically necessary to treat this patient's neck pain today.

## 2024-08-15 ENCOUNTER — PROCEDURE VISIT (OUTPATIENT)
Dept: NEUROLOGY | Facility: CLINIC | Age: 54
End: 2024-08-15
Payer: MEDICARE

## 2024-08-15 DIAGNOSIS — G24.3 CERVICAL DYSTONIA: Primary | ICD-10-CM

## 2024-08-15 NOTE — PROCEDURES
Date of Procedure: 8/15/2024    Reason for Procedure: Chronic Migraine and cervical dystonia      This procedure is deemed medically necessary today to prevent worsening of this patient's chronic severe pain and to prevent ER and hospital utilization for pain at this time.     Informed consent was obtained prior to performing this study. Two patient identifiers were confirmed with the patient prior to performing this study. A time out to determine correct patient and and agreement on procedure performed was conducted prior to the injections.   Topical Geuber's Spray (spray and stretch) was used or patient comfort at the injection sites.   Procedure Details: After informed consent obtained the patient's head and upper neck was cleansed with alcohol rub and 285 Units of Botox (diluted 1:1) was injected in the following bilateral muscles:   5units in corregator* (over 2 sites), 5 units in Procerus, 20 units Frontalis* (over 4 sites), 20 units in Temporalis* (over 4 sites), 30 units in occipitalis* (over 6 sites), 10 units in each semispinalis capitis, 10 units in each splenius capitus, 10 units in the right longismus and 15 units in the left longismus, 60 units in Trapezius* on the right and 70 units on trapezius on the left(over 8 sites). The remaining 15 units were wasted per her last injection series  Levator muscles were not injected as patient stated she had side effects with those injections prior.   The patient tolerated the procedure well with no more than 0.25cc of blood loss. She was observed for several minutes post injection and given a handout from [employer] regarding when and where to seek help if side effects are experienced.     -Note injections are spread to more sights for Trapezius(same amount of Botox) per her request as this helps  -Note her prior known cervical radiculopathy, right median and ulnar neuropathies. She had relief with parafin wax per OT with hand numbness  - She continues cervical  trigger point injections the same week as Botox q 3 months for cervicalgia. Lidoderm topical cream given prior for her neck pain PRN  -Continue Botox for chronic migraine and for cervical dystonia. She uses nearly 300 units as above which does help reduce pain from both conditions for consistently after injection   -She weaned off of hydrocodone via PCP prior and used dry needling as an alternative measure for pain control prior.   -PCP manages anxiety  -Continue Baclofen- has been stable with 50mg nightly in addition to Tizanidine at night instead of daytime dosing. Aware of the risk  the risk of sedation and denies sedation. She uses this for sleep with muscle pain at night and functions well on combination dose   Robaxin used prior  RTC in 3 months or more  for  Botox and for cervical trigger point injections.   Currently able to space injection to q 4 months with good relief

## 2024-10-08 ENCOUNTER — PATIENT MESSAGE (OUTPATIENT)
Dept: NEUROLOGY | Facility: CLINIC | Age: 54
End: 2024-10-08
Payer: MEDICARE

## 2024-11-06 PROBLEM — E16.1 REACTIVE HYPOGLYCEMIA: Status: ACTIVE | Noted: 2024-04-24

## 2024-11-06 PROBLEM — R73.03 PREDIABETES: Chronic | Status: ACTIVE | Noted: 2024-04-24

## 2024-11-06 PROBLEM — E88.810 METABOLIC SYNDROME: Status: RESOLVED | Noted: 2024-04-24 | Resolved: 2024-11-06

## 2024-11-06 PROBLEM — Z71.9 ENCOUNTER FOR EDUCATION: Status: RESOLVED | Noted: 2019-09-10 | Resolved: 2024-11-06

## 2024-11-06 PROBLEM — Z87.828 HISTORY OF MOTOR VEHICLE ACCIDENT: Status: RESOLVED | Noted: 2019-09-10 | Resolved: 2024-11-06

## 2024-11-06 PROBLEM — G57.60: Chronic | Status: ACTIVE | Noted: 2019-09-10

## 2024-11-12 ENCOUNTER — PROCEDURE VISIT (OUTPATIENT)
Dept: NEUROLOGY | Facility: CLINIC | Age: 54
End: 2024-11-12
Payer: MEDICARE

## 2024-11-12 DIAGNOSIS — M54.2 NECK PAIN: Primary | ICD-10-CM

## 2024-11-12 NOTE — PROCEDURES
Procedures  Date: 11/12/2024     Cervical TPI    Trigger points were palpated bilaterally along the Bilateral trapezius, Rhomboids, and Levator Scapulae. Sites were prepped with alcohol, and injected with 1 mL 0.5% Marcaine, using a 5 mL syringe with a 30 gauge ½ inch needle.     Patient tolerated procedure well and blood loss was minimal.     Palpable trigger points noted to C-spine bilaterally. Trigger point injections are medically necessary to treat this patient's neck pain today.

## 2024-11-14 ENCOUNTER — PROCEDURE VISIT (OUTPATIENT)
Dept: NEUROLOGY | Facility: CLINIC | Age: 54
End: 2024-11-14
Payer: MEDICARE

## 2024-11-14 ENCOUNTER — PATIENT MESSAGE (OUTPATIENT)
Dept: NEUROLOGY | Facility: CLINIC | Age: 54
End: 2024-11-14

## 2024-11-14 DIAGNOSIS — M54.2 NECK PAIN: Primary | ICD-10-CM

## 2024-11-14 DIAGNOSIS — G24.3 CERVICAL DYSTONIA: Primary | ICD-10-CM

## 2024-11-14 NOTE — PROCEDURES
Date of Procedure: 11/14/2024    Reason for Procedure: Chronic Migraine and cervical dystonia      This procedure is deemed medically necessary today to prevent worsening of this patient's chronic severe pain and to prevent ER and hospital utilization for pain at this time.     Informed consent was obtained prior to performing this study. Two patient identifiers were confirmed with the patient prior to performing this study. A time out to determine correct patient and and agreement on procedure performed was conducted prior to the injections.   Topical Geuber's Spray (spray and stretch) was used or patient comfort at the injection sites.   Procedure Details: After informed consent obtained the patient's head and upper neck was cleansed with alcohol rub and 285 Units of Botox (diluted 1:1) was injected in the following bilateral muscles:   5units in corregator* (over 2 sites), 5 units in Procerus, 20 units Frontalis* (over 4 sites), 20 units in Temporalis* (over 4 sites), 30 units in occipitalis* (over 6 sites), 10 units in each semispinalis capitis, 10 units in each splenius capitus, 10 units in the right longismus and 15 units in the left longismus, 60 units in Trapezius* on the right and 70 units on trapezius on the left(over 8 sites). The remaining 15 units were wasted per her last injection series  Levator muscles were not injected as patient stated she had side effects with those injections prior.   The patient tolerated the procedure well with no more than 0.25cc of blood loss. She was observed for several minutes post injection and given a handout from [employer] regarding when and where to seek help if side effects are experienced.     -Note injections are spread to more sights for Trapezius(same amount of Botox) per her request as this helps  -Note her prior known cervical radiculopathy, right median and ulnar neuropathies. She had relief with parafin wax per OT with hand numbness  - She continues cervical  trigger point injections the same week as Botox for cervicalgia. Lidoderm topical cream given prior for her neck pain PRN  -Continue Botox for chronic migraine and for cervical dystonia. She uses nearly 300 units as above which does help reduce pain from both conditions for consistently after injection   -She weaned off of hydrocodone via PCP prior and used dry needling as an alternative measure for pain control prior.   -PCP manages anxiety  -Continue Baclofen- has been stable with 50mg nightly in addition to Tizanidine at night instead of daytime dosing. Aware of the risk  the risk of sedation and denies sedation. She uses this for sleep with muscle pain at night and functions well on combination dose   Robaxin used prior  RTC in 3 months or more  for  Botox and for cervical trigger point injections.   Currently able to space injection to q 4 months with good relief

## 2024-11-19 ENCOUNTER — TELEPHONE (OUTPATIENT)
Dept: NEUROLOGY | Facility: CLINIC | Age: 54
End: 2024-11-19
Payer: MEDICARE

## 2024-11-19 NOTE — TELEPHONE ENCOUNTER
----- Message from Dominique sent at 2024  8:58 AM CST -----  Contact: Patient  Daly Ramos  MRN: [id number]  : 1970  PCP: Jessica Sewell  Home Phone      [phone]  Work Phone      Not on file.  Mobile          [phone]      MESSAGE: Patient would like to schedule an appointment sooner than the next available for trigger point. Please return this call.     PHONE; [phone]

## 2024-11-19 NOTE — TELEPHONE ENCOUNTER
She may come in today for a TPI if she likes, and if I feel that she may also benefit from an occipital nerve block, I can order one for next week.

## 2024-11-20 ENCOUNTER — PROCEDURE VISIT (OUTPATIENT)
Dept: NEUROLOGY | Facility: CLINIC | Age: 54
End: 2024-11-20
Payer: MEDICARE

## 2024-11-20 ENCOUNTER — TELEPHONE (OUTPATIENT)
Dept: NEUROLOGY | Facility: CLINIC | Age: 54
End: 2024-11-20

## 2024-11-20 DIAGNOSIS — M54.81 OCCIPITAL NEURALGIA OF LEFT SIDE: Primary | ICD-10-CM

## 2024-11-20 DIAGNOSIS — M54.2 NECK PAIN: Primary | ICD-10-CM

## 2024-11-20 NOTE — PROCEDURES
Procedures  Date: 11/20/2024     Cervical TPI    Trigger points were palpated bilaterally along the left trapezius, Rhomboids, and Levator Scapulae. Sites were prepped with alcohol, and injected with 1 mL 0.5% Marcaine, using a 5 mL syringe with a 30 gauge ½ inch needle.     Patient tolerated procedure well and blood loss was minimal.     Palpable trigger points noted to left C-spine. Trigger point injections are medically necessary to treat this patient's neck pain today.     Patient noted to have significant left occipital tenderness today. I will order a left occipital nerve block to treat her occipital neuralgia complaints. Injections will be done for therapeutic purposes, and are medically necessary to treat her complaints.

## 2024-11-26 ENCOUNTER — PROCEDURE VISIT (OUTPATIENT)
Dept: NEUROLOGY | Facility: CLINIC | Age: 54
End: 2024-11-26
Payer: MEDICARE

## 2024-11-26 DIAGNOSIS — M54.81 BILATERAL OCCIPITAL NEURALGIA: ICD-10-CM

## 2024-11-26 NOTE — PROCEDURES
Procedures  Occipital Nerve Block Procedure Note    Informed consent was obtained (explaining the procedure and risks and benefits of procedure) from patient:  the signed consent form was placed in the medical record.  Patient's left occipital area was palpated to identify location of greater occipital nerve. Alcohol was applied topically to the skin. Using a 25 gauge 1 inch needle (aspirating during insertion), 5 mL 0.5 % Marcaine was injected on the left side (directing needle to center, left and right of painful focus). Pressure with a gauze pad was held briefly upon the site of puncture to minimize bleeding and to further spread anaesthetic subcutaneously. The procedure was repeated on the right side, injecting a further 5 mL on that side.    Procedure was tolerated well with minimal blood loss.

## 2024-12-04 DIAGNOSIS — G24.9 DYSTONIA: ICD-10-CM

## 2024-12-05 ENCOUNTER — PATIENT MESSAGE (OUTPATIENT)
Dept: NEUROLOGY | Facility: CLINIC | Age: 54
End: 2024-12-05
Payer: MEDICARE

## 2024-12-05 DIAGNOSIS — M54.81 OCCIPITAL NEURALGIA, UNSPECIFIED LATERALITY: Primary | ICD-10-CM

## 2024-12-05 RX ORDER — TIZANIDINE 4 MG/1
4 TABLET ORAL EVERY 8 HOURS
Qty: 270 TABLET | Refills: 3 | Status: SHIPPED | OUTPATIENT
Start: 2024-12-05

## 2024-12-05 RX ORDER — BACLOFEN 10 MG/1
TABLET ORAL
Qty: 540 TABLET | Refills: 3 | Status: SHIPPED | OUTPATIENT
Start: 2024-12-05

## 2024-12-11 ENCOUNTER — PROCEDURE VISIT (OUTPATIENT)
Dept: NEUROLOGY | Facility: CLINIC | Age: 54
End: 2024-12-11
Payer: MEDICARE

## 2024-12-11 DIAGNOSIS — M54.81 OCCIPITAL NEURALGIA, UNSPECIFIED LATERALITY: Primary | ICD-10-CM

## 2024-12-11 PROCEDURE — 64405 NJX AA&/STRD GR OCPL NRV: CPT | Mod: 50,S$GLB,, | Performed by: NURSE PRACTITIONER

## 2024-12-23 ENCOUNTER — PATIENT MESSAGE (OUTPATIENT)
Dept: NEUROLOGY | Facility: CLINIC | Age: 54
End: 2024-12-23
Payer: MEDICARE

## 2024-12-26 NOTE — TELEPHONE ENCOUNTER
We would really need to evaluate her in clinic to evaluate further with an exam. She may need updated imaging of her neck. Please schedule her for next available EP.

## 2025-01-02 ENCOUNTER — PATIENT MESSAGE (OUTPATIENT)
Dept: NEUROLOGY | Facility: CLINIC | Age: 55
End: 2025-01-02
Payer: MEDICARE

## 2025-01-08 ENCOUNTER — OFFICE VISIT (OUTPATIENT)
Dept: NEUROLOGY | Facility: CLINIC | Age: 55
End: 2025-01-08
Payer: MEDICARE

## 2025-01-08 VITALS
DIASTOLIC BLOOD PRESSURE: 84 MMHG | HEIGHT: 67 IN | OXYGEN SATURATION: 98 % | HEART RATE: 60 BPM | SYSTOLIC BLOOD PRESSURE: 130 MMHG | BODY MASS INDEX: 28.2 KG/M2 | WEIGHT: 179.69 LBS | RESPIRATION RATE: 16 BRPM

## 2025-01-08 DIAGNOSIS — M54.81 OCCIPITAL NEURALGIA OF LEFT SIDE: ICD-10-CM

## 2025-01-08 DIAGNOSIS — G24.3 CERVICAL DYSTONIA: ICD-10-CM

## 2025-01-08 DIAGNOSIS — M54.2 CHRONIC NECK PAIN: Primary | ICD-10-CM

## 2025-01-08 DIAGNOSIS — M25.519 SHOULDER PAIN, UNSPECIFIED CHRONICITY, UNSPECIFIED LATERALITY: ICD-10-CM

## 2025-01-08 DIAGNOSIS — M50.90 CERVICAL DISC DISEASE: ICD-10-CM

## 2025-01-08 DIAGNOSIS — G89.29 CHRONIC NECK PAIN: Primary | ICD-10-CM

## 2025-01-08 DIAGNOSIS — R20.0 BILATERAL HAND NUMBNESS: ICD-10-CM

## 2025-01-08 PROBLEM — Z71.89 ENCOUNTER TO DISCUSS TREATMENT OPTIONS: Status: RESOLVED | Noted: 2022-05-03 | Resolved: 2025-01-08

## 2025-01-08 PROBLEM — Z71.2 ENCOUNTER TO DISCUSS TEST RESULTS: Status: RESOLVED | Noted: 2022-04-01 | Resolved: 2025-01-08

## 2025-01-08 PROBLEM — G43.709 CHRONIC MIGRAINE WITHOUT AURA WITHOUT STATUS MIGRAINOSUS, NOT INTRACTABLE: Status: ACTIVE | Noted: 2017-11-28

## 2025-01-08 PROCEDURE — 99999 PR PBB SHADOW E&M-EST. PATIENT-LVL IV: CPT | Mod: PBBFAC,,, | Performed by: NURSE PRACTITIONER
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
## 2025-01-17 ENCOUNTER — HOSPITAL ENCOUNTER (OUTPATIENT)
Dept: RADIOLOGY | Facility: HOSPITAL | Age: 55
Discharge: HOME | End: 2025-01-17
Attending: NURSE PRACTITIONER
Payer: MEDICARE

## 2025-01-17 DIAGNOSIS — R20.0 BILATERAL HAND NUMBNESS: ICD-10-CM

## 2025-01-17 DIAGNOSIS — G89.29 CHRONIC NECK PAIN: ICD-10-CM

## 2025-01-17 DIAGNOSIS — M50.90 CERVICAL DISC DISEASE: ICD-10-CM

## 2025-01-17 DIAGNOSIS — M25.519 SHOULDER PAIN, UNSPECIFIED CHRONICITY, UNSPECIFIED LATERALITY: ICD-10-CM

## 2025-01-17 DIAGNOSIS — G24.3 CERVICAL DYSTONIA: ICD-10-CM

## 2025-01-17 DIAGNOSIS — M54.2 CHRONIC NECK PAIN: ICD-10-CM

## 2025-01-17 PROCEDURE — 72141 MRI NECK SPINE W/O DYE: CPT | Mod: TC

## 2025-01-17 PROCEDURE — 72141 MRI NECK SPINE W/O DYE: CPT | Mod: 26,,, | Performed by: RADIOLOGY

## 2025-01-17 PROCEDURE — 73030 X-RAY EXAM OF SHOULDER: CPT | Mod: 26,RT,, | Performed by: RADIOLOGY

## 2025-01-17 PROCEDURE — 73030 X-RAY EXAM OF SHOULDER: CPT | Mod: TC,RT

## 2025-01-28 DIAGNOSIS — M79.601 RIGHT ARM PAIN: ICD-10-CM

## 2025-01-28 DIAGNOSIS — G89.29 CHRONIC NECK PAIN: Primary | ICD-10-CM

## 2025-01-28 DIAGNOSIS — M54.2 CHRONIC NECK PAIN: Primary | ICD-10-CM

## 2025-03-12 ENCOUNTER — PROCEDURE VISIT (OUTPATIENT)
Dept: NEUROLOGY | Facility: CLINIC | Age: 55
End: 2025-03-12
Payer: MEDICARE

## 2025-03-12 DIAGNOSIS — M54.2 NECK PAIN: Primary | ICD-10-CM

## 2025-03-12 NOTE — PROCEDURES
Procedures  Date: 03/12/2025     Cervical TPI    Trigger points were palpated bilaterally along the Bilateral trapezius, Rhomboids, and Levator Scapulae. Sites were prepped with alcohol, and injected with 1 mL 0.5% Marcaine, using a 5 mL syringe with a 30 gauge ½ inch needle.     Patient tolerated procedure well and blood loss was minimal.     Palpable trigger points noted to C-spine bilaterally. Trigger point injections are medically necessary to treat this patient's neck pain today.

## 2025-03-13 ENCOUNTER — PROCEDURE VISIT (OUTPATIENT)
Dept: NEUROLOGY | Facility: CLINIC | Age: 55
End: 2025-03-13
Payer: MEDICARE

## 2025-03-13 DIAGNOSIS — G24.3 CERVICAL DYSTONIA: Primary | ICD-10-CM

## 2025-03-13 NOTE — PROCEDURES
[facility] Procedure note    Date of Procedure: 3/13/2025    Reason for Procedure: Chronic Migraine and cervical dystonia      This procedure is deemed medically necessary today to prevent worsening of this patient's chronic severe pain and to prevent ER and hospital utilization for pain at this time.     Informed consent was obtained prior to performing this study. Two patient identifiers were confirmed with the patient prior to performing this study. A time out to determine correct patient and and agreement on procedure performed was conducted prior to the injections.   Topical Geuber's Spray (spray and stretch) was used or patient comfort at the injection sites.   Procedure Details: After informed consent obtained the patient's head and upper neck was cleansed with alcohol rub and 285 Units of Botox (diluted 1:1) was injected in the following bilateral muscles:   5units in corregator* (over 2 sites), 5 units in Procerus, 20 units Frontalis* (over 4 sites), 20 units in Temporalis* (over 4 sites), 30 units in occipitalis* (over 6 sites), 10 units in each semispinalis capitis, 10 units in each splenius capitus, 10 units in the right longismus and 15 units in the left longismus, 60 units in Trapezius* on the right and 70 units on trapezius on the left(over 8 sites). The remaining 15 units were wasted per her last injection series  Levator muscles were not injected as patient stated she had side effects with those injections prior.   The patient tolerated the procedure well with no more than 0.25cc of blood loss. She was observed for several minutes post injection and given a handout from [employer] regarding when and where to seek help if side effects are experienced.     -Note injections are spread to more sights for Trapezius(same amount of Botox) per her request as this helps  -Note her prior known cervical radiculopathy, right median and ulnar neuropathies. She had relief with parafin wax per OT with hand  numbness  -Updated MRI C spine 1/2025 showed mild spinal stenosis and significant NF narrowing: Having PT for increased neck pain and responding. Can also consult pina management at any point  - She continues cervical trigger point injections the same week as Botox for cervicalgia. Lidoderm topical cream given prior for her neck pain PRN  -Continue Botox for chronic migraine and for cervical dystonia. She uses nearly 300 units as above which does help reduce pain from both conditions for consistently after injection   -She weaned off of hydrocodone via PCP prior and used dry needling as an alternative measure for pain control prior.   -PCP manages anxiety  -Continue Baclofen- has been stable with 50mg nightly in addition to Tizanidine at night instead of daytime dosing. Aware of the risk  the risk of sedation and denies sedation. She uses this for sleep with muscle pain at night and functions well on combination dose   Robaxin used prior  RTC in 3 months or more  for  Botox and for cervical trigger point injections.   Currently able to space injection to q 4 months with good relief

## 2025-05-01 ENCOUNTER — PATIENT MESSAGE (OUTPATIENT)
Dept: NEUROLOGY | Facility: CLINIC | Age: 55
End: 2025-05-01
Payer: MEDICARE

## 2025-05-07 PROBLEM — M25.519 SHOULDER PAIN: Chronic | Status: ACTIVE | Noted: 2025-01-08

## 2025-05-22 ENCOUNTER — OFFICE VISIT (OUTPATIENT)
Dept: NEUROLOGY | Facility: CLINIC | Age: 55
End: 2025-05-22
Payer: MEDICARE

## 2025-05-22 VITALS
HEIGHT: 67 IN | OXYGEN SATURATION: 97 % | HEART RATE: 68 BPM | DIASTOLIC BLOOD PRESSURE: 78 MMHG | SYSTOLIC BLOOD PRESSURE: 126 MMHG | WEIGHT: 184.94 LBS | BODY MASS INDEX: 29.03 KG/M2 | RESPIRATION RATE: 16 BRPM

## 2025-05-22 DIAGNOSIS — M79.18 MYOFASCIAL PAIN: ICD-10-CM

## 2025-05-22 DIAGNOSIS — G43.709 CHRONIC MIGRAINE WITHOUT AURA WITHOUT STATUS MIGRAINOSUS, NOT INTRACTABLE: ICD-10-CM

## 2025-05-22 DIAGNOSIS — M50.90 CERVICAL DISC DISEASE: ICD-10-CM

## 2025-05-22 DIAGNOSIS — G24.3 CERVICAL DYSTONIA: Primary | ICD-10-CM

## 2025-05-22 DIAGNOSIS — M54.81 OCCIPITAL NEURALGIA OF LEFT SIDE: ICD-10-CM

## 2025-05-22 PROCEDURE — 1159F MED LIST DOCD IN RCRD: CPT | Mod: CPTII,S$GLB,, | Performed by: NURSE PRACTITIONER

## 2025-05-22 PROCEDURE — 99214 OFFICE O/P EST MOD 30 MIN: CPT | Mod: S$GLB,,, | Performed by: NURSE PRACTITIONER

## 2025-05-22 PROCEDURE — 3078F DIAST BP <80 MM HG: CPT | Mod: CPTII,S$GLB,, | Performed by: NURSE PRACTITIONER

## 2025-05-22 PROCEDURE — 3061F NEG MICROALBUMINURIA REV: CPT | Mod: CPTII,S$GLB,, | Performed by: NURSE PRACTITIONER

## 2025-05-22 PROCEDURE — 99999 PR PBB SHADOW E&M-EST. PATIENT-LVL IV: CPT | Mod: PBBFAC,,, | Performed by: NURSE PRACTITIONER

## 2025-05-22 PROCEDURE — 3044F HG A1C LEVEL LT 7.0%: CPT | Mod: CPTII,S$GLB,, | Performed by: NURSE PRACTITIONER

## 2025-05-22 PROCEDURE — 3008F BODY MASS INDEX DOCD: CPT | Mod: CPTII,S$GLB,, | Performed by: NURSE PRACTITIONER

## 2025-05-22 PROCEDURE — 3074F SYST BP LT 130 MM HG: CPT | Mod: CPTII,S$GLB,, | Performed by: NURSE PRACTITIONER

## 2025-05-22 PROCEDURE — 3066F NEPHROPATHY DOC TX: CPT | Mod: CPTII,S$GLB,, | Performed by: NURSE PRACTITIONER
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
## 2025-05-28 PROBLEM — E78.2 MIXED HYPERLIPIDEMIA: Status: ACTIVE | Noted: 2023-08-29

## 2025-05-28 PROBLEM — M25.512 CHRONIC LEFT SHOULDER PAIN: Status: ACTIVE | Noted: 2025-01-08

## 2025-05-28 PROBLEM — G89.29 CHRONIC LEFT SHOULDER PAIN: Status: ACTIVE | Noted: 2025-01-08

## 2025-07-09 ENCOUNTER — PROCEDURE VISIT (OUTPATIENT)
Dept: NEUROLOGY | Facility: CLINIC | Age: 55
End: 2025-07-09
Payer: MEDICARE

## 2025-07-09 DIAGNOSIS — M54.2 NECK PAIN: Primary | ICD-10-CM

## 2025-07-09 NOTE — PROCEDURES
Procedures  Date: 07/09/2025     Cervical TPI    Trigger points were palpated bilaterally along the Bilateral trapezius, Rhomboids, and Levator Scapulae. Sites were prepped with alcohol, and injected with 1 mL 0.5% Marcaine, using a 5 mL syringe with a 30 gauge ½ inch needle.     Patient tolerated procedure well and blood loss was minimal.     Palpable trigger points noted to C-spine bilaterally. Trigger point injections are medically necessary to treat this patient's neck pain today.

## 2025-07-10 ENCOUNTER — PROCEDURE VISIT (OUTPATIENT)
Dept: NEUROLOGY | Facility: CLINIC | Age: 55
End: 2025-07-10
Payer: MEDICARE

## 2025-07-10 DIAGNOSIS — G24.3 CERVICAL DYSTONIA: Primary | ICD-10-CM

## 2025-07-10 NOTE — PROCEDURES
BOTOX Procedure note     Date of Procedure: 7/10/2025    Reason for Procedure: Chronic Migraine and cervical dystonia      This procedure is deemed medically necessary today to prevent worsening of this patient's chronic severe pain and to prevent ER and hospital utilization for pain at this time.     Informed consent was obtained prior to performing this study. Two patient identifiers were confirmed with the patient prior to performing this study. A time out to determine correct patient and and agreement on procedure performed was conducted prior to the injections.   Topical Geuber's Spray (spray and stretch) was used or patient comfort at the injection sites.   Procedure Details: After informed consent obtained the patient's head and upper neck was cleansed with alcohol rub and 255 Units of Botox (diluted 1:1) was injected in the following bilateral muscles:    5 units in Procerus,  20 units in Temporalis* (over 4 sites), 30 units in occipitalis* (over 6 sites), 10 units in each semispinalis capitis, 10 units in each splenius capitus, 10 units in the right longismus and 15 units in the left longismus, 60 units in Trapezius* on the right and 70 units on trapezius on the left(over 8 sites). The remaining 45 units were wasted   Levator muscles were not injected as patient stated she had side effects with those injections prior.   Frontalis muscles and  Muscles  were not injected due to eye lid drooping complaint with last botox per patient's request  The patient tolerated the procedure well with no more than 0.25cc of blood loss. She was observed for several minutes post injection and given a handout from [employer] regarding when and where to seek help if side effects are experienced.     -Note injections are spread to more sights for Trapezius(same amount of Botox) per her request as this helps. Good relief with migraine and Neck pain with above botox treatment    She is currently following in neurology  clinic for TPIs and ON blocks PRN/  Will continue to follow in clinic routinely as scheduled.   Currently able to space Botox injection to q 4 months with good relief  Eliminated forehead injections today as above. Migraines are well controlled with Topamax for weight loss per PCP currently.